# Patient Record
Sex: FEMALE | Race: AMERICAN INDIAN OR ALASKA NATIVE | NOT HISPANIC OR LATINO | Employment: OTHER | URBAN - METROPOLITAN AREA
[De-identification: names, ages, dates, MRNs, and addresses within clinical notes are randomized per-mention and may not be internally consistent; named-entity substitution may affect disease eponyms.]

---

## 2017-03-10 ENCOUNTER — ALLSCRIPTS OFFICE VISIT (OUTPATIENT)
Dept: OTHER | Facility: OTHER | Age: 57
End: 2017-03-10

## 2017-03-10 DIAGNOSIS — E03.9 HYPOTHYROIDISM: ICD-10-CM

## 2017-03-28 ENCOUNTER — HOSPITAL ENCOUNTER (OUTPATIENT)
Dept: RADIOLOGY | Facility: HOSPITAL | Age: 57
Discharge: HOME | End: 2017-03-28
Payer: COMMERCIAL

## 2017-03-28 ENCOUNTER — HOSPITAL ENCOUNTER (OUTPATIENT)
Dept: NON INVASIVE DIAGNOSTICS | Facility: HOSPITAL | Age: 57
Discharge: HOME | End: 2017-03-28
Payer: COMMERCIAL

## 2017-03-28 DIAGNOSIS — E03.9 HYPOTHYROIDISM: ICD-10-CM

## 2017-03-28 PROCEDURE — 93923 UPR/LXTR ART STDY 3+ LVLS: CPT

## 2017-03-28 PROCEDURE — 93925 LOWER EXTREMITY STUDY: CPT

## 2017-03-28 PROCEDURE — 73502 X-RAY EXAM HIP UNI 2-3 VIEWS: CPT

## 2017-03-30 ENCOUNTER — APPOINTMENT (OUTPATIENT)
Dept: LAB | Facility: HOSPITAL | Age: 57
End: 2017-03-30
Payer: COMMERCIAL

## 2017-03-30 DIAGNOSIS — E03.9 HYPOTHYROIDISM: ICD-10-CM

## 2017-03-30 LAB
ALBUMIN SERPL BCP-MCNC: 3.3 G/DL (ref 3.5–5)
ALP SERPL-CCNC: 75 U/L (ref 46–116)
ALT SERPL W P-5'-P-CCNC: 30 U/L (ref 12–78)
ANION GAP SERPL CALCULATED.3IONS-SCNC: 4 MMOL/L (ref 4–13)
AST SERPL W P-5'-P-CCNC: 25 U/L (ref 5–45)
BILIRUB SERPL-MCNC: 0.4 MG/DL (ref 0.2–1)
BUN SERPL-MCNC: 15 MG/DL (ref 5–25)
CALCIUM SERPL-MCNC: 9.2 MG/DL (ref 8.3–10.1)
CHLORIDE SERPL-SCNC: 103 MMOL/L (ref 100–108)
CHOLEST SERPL-MCNC: 153 MG/DL (ref 50–200)
CO2 SERPL-SCNC: 32 MMOL/L (ref 21–32)
CREAT SERPL-MCNC: 0.81 MG/DL (ref 0.6–1.3)
GFR SERPL CREATININE-BSD FRML MDRD: >60 ML/MIN/1.73SQ M
GLUCOSE P FAST SERPL-MCNC: 95 MG/DL (ref 65–99)
HDLC SERPL-MCNC: 67 MG/DL (ref 40–60)
LDLC SERPL CALC-MCNC: 69 MG/DL (ref 0–100)
POTASSIUM SERPL-SCNC: 4.5 MMOL/L (ref 3.5–5.3)
PROT SERPL-MCNC: 7.4 G/DL (ref 6.4–8.2)
SODIUM SERPL-SCNC: 139 MMOL/L (ref 136–145)
TRIGL SERPL-MCNC: 85 MG/DL
TSH SERPL DL<=0.05 MIU/L-ACNC: 2.9 UIU/ML (ref 0.36–3.74)

## 2017-03-30 PROCEDURE — 86430 RHEUMATOID FACTOR TEST QUAL: CPT

## 2017-03-30 PROCEDURE — 36415 COLL VENOUS BLD VENIPUNCTURE: CPT

## 2017-03-30 PROCEDURE — 80053 COMPREHEN METABOLIC PANEL: CPT

## 2017-03-30 PROCEDURE — 80061 LIPID PANEL: CPT

## 2017-03-30 PROCEDURE — 84443 ASSAY THYROID STIM HORMONE: CPT

## 2017-03-31 LAB — RHEUMATOID FACT SER QL LA: NEGATIVE

## 2017-04-04 ENCOUNTER — GENERIC CONVERSION - ENCOUNTER (OUTPATIENT)
Dept: OTHER | Facility: OTHER | Age: 57
End: 2017-04-04

## 2017-06-14 ENCOUNTER — GENERIC CONVERSION - ENCOUNTER (OUTPATIENT)
Dept: OTHER | Facility: OTHER | Age: 57
End: 2017-06-14

## 2017-08-05 ENCOUNTER — GENERIC CONVERSION - ENCOUNTER (OUTPATIENT)
Dept: OTHER | Facility: OTHER | Age: 57
End: 2017-08-05

## 2017-11-06 ENCOUNTER — ALLSCRIPTS OFFICE VISIT (OUTPATIENT)
Dept: OTHER | Facility: OTHER | Age: 57
End: 2017-11-06

## 2017-11-06 DIAGNOSIS — M79.641 PAIN OF RIGHT HAND: ICD-10-CM

## 2017-11-07 ENCOUNTER — GENERIC CONVERSION - ENCOUNTER (OUTPATIENT)
Dept: OTHER | Facility: OTHER | Age: 57
End: 2017-11-07

## 2017-11-15 ENCOUNTER — TRANSCRIBE ORDERS (OUTPATIENT)
Dept: ADMINISTRATIVE | Facility: HOSPITAL | Age: 57
End: 2017-11-15

## 2017-11-15 ENCOUNTER — APPOINTMENT (OUTPATIENT)
Dept: RADIOLOGY | Facility: MEDICAL CENTER | Age: 57
End: 2017-11-15
Payer: COMMERCIAL

## 2017-11-15 DIAGNOSIS — M79.641 PAIN OF RIGHT HAND: ICD-10-CM

## 2017-11-15 PROCEDURE — 73130 X-RAY EXAM OF HAND: CPT

## 2017-11-17 ENCOUNTER — GENERIC CONVERSION - ENCOUNTER (OUTPATIENT)
Dept: OTHER | Facility: OTHER | Age: 57
End: 2017-11-17

## 2017-12-11 ENCOUNTER — GENERIC CONVERSION - ENCOUNTER (OUTPATIENT)
Dept: FAMILY MEDICINE CLINIC | Facility: CLINIC | Age: 57
End: 2017-12-11

## 2017-12-11 ENCOUNTER — APPOINTMENT (OUTPATIENT)
Dept: OCCUPATIONAL THERAPY | Facility: MEDICAL CENTER | Age: 57
End: 2017-12-11
Payer: COMMERCIAL

## 2017-12-11 DIAGNOSIS — M79.641 PAIN OF RIGHT HAND: ICD-10-CM

## 2017-12-11 PROCEDURE — 97166 OT EVAL MOD COMPLEX 45 MIN: CPT

## 2017-12-11 PROCEDURE — G8990 OTHER PT/OT CURRENT STATUS: HCPCS

## 2017-12-11 PROCEDURE — G8991 OTHER PT/OT GOAL STATUS: HCPCS

## 2017-12-13 ENCOUNTER — APPOINTMENT (OUTPATIENT)
Dept: OCCUPATIONAL THERAPY | Facility: MEDICAL CENTER | Age: 57
End: 2017-12-13
Payer: COMMERCIAL

## 2017-12-14 ENCOUNTER — APPOINTMENT (OUTPATIENT)
Dept: OCCUPATIONAL THERAPY | Facility: MEDICAL CENTER | Age: 57
End: 2017-12-14
Payer: COMMERCIAL

## 2017-12-14 PROCEDURE — 97010 HOT OR COLD PACKS THERAPY: CPT

## 2017-12-14 PROCEDURE — 97140 MANUAL THERAPY 1/> REGIONS: CPT

## 2017-12-14 PROCEDURE — 97110 THERAPEUTIC EXERCISES: CPT

## 2017-12-18 ENCOUNTER — APPOINTMENT (OUTPATIENT)
Dept: OCCUPATIONAL THERAPY | Facility: MEDICAL CENTER | Age: 57
End: 2017-12-18
Payer: COMMERCIAL

## 2017-12-18 PROCEDURE — 97010 HOT OR COLD PACKS THERAPY: CPT

## 2017-12-18 PROCEDURE — 97110 THERAPEUTIC EXERCISES: CPT

## 2017-12-18 PROCEDURE — 97140 MANUAL THERAPY 1/> REGIONS: CPT

## 2017-12-20 ENCOUNTER — APPOINTMENT (OUTPATIENT)
Dept: OCCUPATIONAL THERAPY | Facility: MEDICAL CENTER | Age: 57
End: 2017-12-20
Payer: COMMERCIAL

## 2017-12-20 PROCEDURE — 97140 MANUAL THERAPY 1/> REGIONS: CPT

## 2017-12-20 PROCEDURE — 97110 THERAPEUTIC EXERCISES: CPT

## 2017-12-27 ENCOUNTER — APPOINTMENT (OUTPATIENT)
Dept: OCCUPATIONAL THERAPY | Facility: MEDICAL CENTER | Age: 57
End: 2017-12-27
Payer: COMMERCIAL

## 2017-12-27 PROCEDURE — 97110 THERAPEUTIC EXERCISES: CPT

## 2017-12-27 PROCEDURE — 97140 MANUAL THERAPY 1/> REGIONS: CPT

## 2017-12-27 PROCEDURE — 97010 HOT OR COLD PACKS THERAPY: CPT

## 2017-12-29 ENCOUNTER — ALLSCRIPTS OFFICE VISIT (OUTPATIENT)
Dept: OTHER | Facility: OTHER | Age: 57
End: 2017-12-29

## 2017-12-30 NOTE — PROGRESS NOTES
Assessment   1  Ganglion cyst of tendon sheath of right hand (729 42) (M67 441)    Plan   Ganglion cyst of tendon sheath of right hand    · Joint Bursa Aspiration &/or Injection Small - POC; Status:Complete - Retrospective    Authorization;   Done: [id number]   · Continue with our present treatment plan ; Status:Complete - Retrospective    Authorization;   Done: 63WIL6776   · Follow-up PRN Evaluation and Treatment  Follow-up  Status: Complete - Retrospective    Authorization  Done: [id number]    Discussion/Summary      R dorsal hand cyst and R wrist tendonitis   was discussed with the patient today  it was discussed with the patient that is a 50/50 chance on the cyst reoccurring wishes to go through with aspiration,   5cc was drained today in office the patient, site, and procedure were identified, verbal consent was obtained  The R dorsal hand was sterilely prepped and injected with 1% lidocaine without epinephrine  After confirmation of adequate anesthesia, the area was prepped again and the ganglion was aspirated with an 18 gauge needle  Sterile dressings were applied  The patient tolerated the procedure well  The patient is aware of a 50% success rate with possible recurrence of the ganglion cyst  prn    attestation:   FABIENNE Alanis, ATC, am acting as a scribe while in the presence of the attending physician  History of Present Illness   HPI: Pt is a 61year old female who presents today with R wrist pain and lump on the dorsal aspect of her hand and on the volar radial side of R wrist  The lump appeared about 3 months ago  She notices that she is dropping items, notices weakness and some numbness and tingling in her R index, long, ring and small fingers  Pt has been going to physical therapy which she states is not improving her symptoms  past medical history, medications, allergies, and review of systems have been read and reviewed on the chart and have been updated      of Systems: Negative Negative except as above As above Negative except as above Negative              Active Problems   1  Encounter for monitoring SBE (subacute bacterial endocarditis) prophylaxis (V58 83)     (Z51 81)   2  Flu vaccine need (V04 81) (Z23)   3  Hip pain, right (719 45) (M25 551)   4  Hyperlipidemia (272 4) (E78 5)   5  Hypertension (401 9) (I10)   6  Hypothyroidism (244 9) (E03 9)   7  Pain of right hand (729 5) (M79 641)   8  Paresthesias (782 0) (R20 2)   9  Right foot pain (729 5) (M79 671)    Past Medical History    · History of Hearing Loss (389 9)   · Denied: History of alcohol abuse   · Denied: History of mental disorder   · Denied: History of substance abuse   · History of Viral syndrome (079 99) (B34 9)    Surgical History    · History of Gallbladder Surgery   · History of Hernia Repair    Family History   Mother    · Family history of Diabetes   · Family history of alcohol abuse (V61 41) (Z81 1)  Sister    · Family history of alcohol abuse (V61 41) (Z81 1)  Brother    · Family history of alcohol abuse (V61 41) (Z81 1)  Family History    · Denied: Family history of mental disorder   · Denied: Family history of substance abuse   · Family history of Hypertension   · Family history of No Significant Family History   · Family history of Stroke    Social History    · Always uses seat belt   · Being A Social Drinker   · [employer]   · Lives with spouse   · daughter   ·    · Never A Smoker   · No advance directives (V49 89) (Z78 9)   · Retired   · [employer]   · Supportive and safe   · Two children   · Uses     Current Meds    1  CVS Aspirin Adult Low Dose 81 MG Oral Tablet Chewable; CHEW AND SWALLOW 1     TABLET DAILY; Therapy: 90RPP3629 to (Evaluate:11Oct2017)  Requested for: 87QUN6713; Last     Rx:13Jun2017 Ordered   2  Lisinopril 10 MG Oral Tablet; Take 1 tablet daily as directed;      Therapy: 55YIL5911 to (Evaluate:27Oct2018)  Requested for: 41YSQ2930; Last Rx:01Nov2017 Ordered   3  Pravastatin Sodium 40 MG Oral Tablet; TAKE 1 TABLET DAILY AS DIRECTED; Therapy: 30Kti2246 to (Evaluate:27Oct2018)  Requested for: 62SSP7635; Last     Rx:01Nov2017 Ordered    Allergies   1  Penicillins   2  Codeine Derivatives    Vitals   Signs   Heart Rate: 82  Systolic: 042, LUE, Sitting  Diastolic: 69, LUE, Sitting  Height: 5 ft   Weight: 184 lb   BMI Calculated: 35 94  BSA Calculated: 1 8    Physical Exam      Right Wrist: Appearance: Normal except  Tenderness: None except the  ROM: Full except as noted: Motor: Normal except as noted: Special Tests: Negative except  (dorsal cyst on R hand extensor tendon, R volar radial cyst  full wrist and elbow ROM  full fist formation   minimal swelling of R wrist  )           Procedure      Procedure: Aspiration of the ganglion cyst tendon sheath on the left    Procedure Note:    Post-Procedure:      Signatures    Electronically signed by : RACHELLE Martins ; Dec 29 2017  5:39PM EST                       (Author)

## 2018-01-10 NOTE — PROGRESS NOTES
Assessment    1  Encounter for preventive health examination (V70 0) (Z00 00)    Plan   Pain of right hand    · * XR HAND 3+ VIEW RIGHT; Status:Active; Requested CVA:29FKG3907;     2 - [facility]  (Podiatry) Co-Management  *  Status: Hold For - Scheduling  Requested for: 26HLB9384  Ordered; For: Right foot pain;  Ordered By: Marita Galeazzi  Performed:   Due: 11FGI4716  Fluzone Quadrivalent Intramuscular Suspension; INJECT 0 5  ML Intramuscular; Dose: 0 5; Route: Intramuscular; Site: Left Thigh; Done: 14KEW5089 12:01PM; Status: Complete - Retrospective By Protocol Authorization;  Ordered  For: Flu vaccine need; Ordered By:Richard Preston; Effective Date:06Nov2017; Administered by: Erica Frazier MA: 11/6/2017 12:01:00 PM; Last Updated By: Erica Frazier; 11/6/2017 12:42:36 PM     Discussion/Summary  health maintenance visit healthy adult female Currently, she eats an adequate diet and has an adequate exercise regimen  cervical cancer screening is current Advice and education were given regarding nutrition  Patient discussion: discussed with the patient  Tylenol three times a day, 1,000 mg  Motrin, Ibuprofen, advil; 600 mg three times a day  Or  Aleve 220 mg twice a day  The treatment plan was reviewed with the patient/guardian  The patient/guardian understands and agrees with the treatment plan      Chief Complaint  Pt here for physical      History of Present Illness  HM, Adult Female: The patient is being seen for a health maintenance evaluation  The last health maintenance visit was 1 year(s) ago  General Health: The patient's health since the last visit is described as good  She has regular dental visits  Lifestyle:  She consumes a diverse and healthy diet  She has weight concerns  Weight control issues: obesity  She exercises regularly  (walking)   She does not use tobacco  She denies alcohol use  She denies drug use     Reproductive health: the patient is postmenopausal   she reports normal menses  she uses no contraception  she is not sexually active  Screening: cancer screening reviewed and current  Cervical cancer screening includes a pap smear performed 2017  Colorectal cancer screening includes a colonoscopy performed 8/15  metabolic screening reviewed and current  Metabolic screening includes lipid profile performed within the past five years and glucose screening performed last year  Cardiovascular risk factors: sedentary lifestyle  Review of Systems    Constitutional: No fever, no chills, feels well, no tiredness, no recent weight gain or weight loss  Cardiovascular: No complaints of slow heart rate, no fast heart rate, no chest pain, no palpitations, no leg claudication, no lower extremity edema  Respiratory: No complaints of shortness of breath, no wheezing, no cough, no SOB on exertion, no orthopnea, no PND  The patient presents with complaints of arthralgias (right wrist and right outside of foot  )  ROS reviewed  Active Problems    1  Encounter for monitoring SBE (subacute bacterial endocarditis) prophylaxis (V58 83)   (Z51 81)   2  Flu vaccine need (V04 81) (Z23)   3  Hip pain, right (719 45) (M25 551)   4  Hyperlipidemia (272 4) (E78 5)   5  Hypertension (401 9) (I10)   6  Hypothyroidism (244 9) (E03 9)   7   Paresthesias (782 0) (R20 2)    Past Medical History    · History of Hearing Loss (389 9)   · Denied: History of alcohol abuse   · Denied: History of mental disorder   · Denied: History of substance abuse   · History of Viral syndrome (079 99) (B34 9)    Surgical History    · History of Gallbladder Surgery   · History of Hernia Repair    Family History  Mother    · Family history of Diabetes   · Family history of alcohol abuse (V61 41) (Z81 1)  Sister    · Family history of alcohol abuse (V61 41) (Z81 1)  Brother    · Family history of alcohol abuse (V61 41) (Z81 1)  Family History    · Denied: Family history of mental disorder   · Denied: Family history of substance abuse   · Family history of Hypertension   · Family history of No Significant Family History   · Family history of Stroke    Social History    · Always uses seat belt   · Being A Social Drinker   · [employer]   · Lives with spouse   · daughter   ·    · Never A Smoker   · No advance directives (V49 89) (Z78 9)   · Retired   · [employer]   · Supportive and safe   · Two children   · Uses     Current Meds   1  CVS Aspirin Adult Low Dose 81 MG Oral Tablet Chewable; CHEW AND SWALLOW 1   TABLET DAILY; Therapy: 23IEP5106 to (Evaluate:11Oct2017)  Requested for: 68SHV9590; Last   Rx:13Jun2017 Ordered   2  Lisinopril 10 MG Oral Tablet; Take 1 tablet daily as directed; Therapy: 83Kdy6703 to (Evaluate:27Oct2018)  Requested for: 42RMC7661; Last   Rx:01Nov2017 Ordered   3  Pravastatin Sodium 40 MG Oral Tablet; TAKE 1 TABLET DAILY AS DIRECTED; Therapy: 76Xad0882 to (Evaluate:27Oct2018)  Requested for: 43HHO5098; Last   Rx:01Nov2017 Ordered    Allergies    1  Penicillins   2  Codeine Derivatives    Vitals   Recorded: 55EDN9890 11:49AM   Temperature 97 6 F, Temporal   Heart Rate 92   Respiration 16   Systolic 074   Diastolic 86   Height 5 ft    Weight 183 lb 8 oz   BMI Calculated 35 84   BSA Calculated 1 8     Physical Exam    Constitutional   General appearance: No acute distress, well appearing and well nourished  Head and Face   Head and face: Normal     Ears, Nose, Mouth, and Throat   Otoscopic examination: Tympanic membranes translucent with normal light reflex  Canals patent without erythema  Nasal mucosa, septum, and turbinates: Normal without edema or erythema  Lips, teeth, and gums: Normal, good dentition  Oropharynx: Normal with no erythema, edema, exudate or lesions  Neck   Neck: Supple, symmetric, trachea midline, no masses  Pulmonary   Respiratory effort: No increased work of breathing or signs of respiratory distress      Auscultation of lungs: Clear to auscultation  Cardiovascular   Auscultation of heart: Normal rate and rhythm, normal S1 and S2, no murmurs  Abdomen   Abdomen: Non-tender, no masses  Musculoskeletal   Gait and station: Normal     Joints, bones, and muscles: Abnormal   Appearance - right wrist swelling and right wrist deformity  Palpation - right wrist tenderness  Range of motion: Normal     Muscle strength/tone: Normal     Skin   Palpation of skin and subcutaneous tissue: Normal turgor  Psychiatric   Orientation to person, place, and time: Normal     Mood and affect: Normal        Health Management  Hyperlipidemia   (1) T4, FREE; every 6 months; Next Due: 37AGN5700; Overdue    Signatures   Electronically signed by : Mai Saleem; Nov 6 2017 12:39PM EST                       (Author)    Electronically signed by :  Juan Pablo Pang MD; Nov 6 2017  2:06PM EST                       (Author)

## 2018-01-10 NOTE — RESULT NOTES
Verified Results  * XR HAND 3+ VIEW RIGHT 10FOW8546 09:13AM Lisandro Hammond Order Number: [id number]     Test Name Result Flag Reference   XR HAND 3+ VW RIGHT (Report)     RIGHT HAND     INDICATION: M79 641: Pain in right hand  History taken directly from the electronic ordering system  COMPARISON: None     VIEWS: 3     IMAGES: 3     For the purposes of institution wide universal language the following terms will apply: (thumb=1st digit/finger, index finger=2nd digit/finger, long finger=3rd digit/finger, ring=4th digit/finger and small finger=5th digit/finger)     FINDINGS:     There is no acute fracture or dislocation  No degenerative changes  No lytic or blastic lesions are seen  Soft tissues are unremarkable  IMPRESSION:     No acute osseous abnormality         Workstation performed: [id number]     Signed by:   Sandra Rivera MD   11/21/17

## 2018-01-13 VITALS
SYSTOLIC BLOOD PRESSURE: 130 MMHG | DIASTOLIC BLOOD PRESSURE: 84 MMHG | BODY MASS INDEX: 37.22 KG/M2 | WEIGHT: 189.6 LBS | HEART RATE: 72 BPM | HEIGHT: 60 IN

## 2018-01-14 VITALS
WEIGHT: 183.5 LBS | HEART RATE: 92 BPM | DIASTOLIC BLOOD PRESSURE: 86 MMHG | RESPIRATION RATE: 16 BRPM | SYSTOLIC BLOOD PRESSURE: 132 MMHG | BODY MASS INDEX: 36.02 KG/M2 | HEIGHT: 60 IN | TEMPERATURE: 97.6 F

## 2018-01-15 NOTE — RESULT NOTES
Verified Results  (1) COMPREHENSIVE METABOLIC PANEL 98ITM4423 43:23GV Dominga Lopez Order Number: [id number]     Test Name Result Flag Reference   SODIUM 139 mmol/L  136-145   POTASSIUM 4 5 mmol/L  3 5-5 3   CHLORIDE 103 mmol/L  100-108   CARBON DIOXIDE 32 mmol/L  21-32   ANION GAP (CALC) 4 mmol/L  4-13   BLOOD UREA NITROGEN 15 mg/dL  5-25   CREATININE 0 81 mg/dL  0 60-1 30   Standardized to IDMS reference method   CALCIUM 9 2 mg/dL  8 3-10 1   BILI, TOTAL 0 40 mg/dL  0 20-1 00   ALK PHOSPHATAS 75 U/L     ALT (SGPT) 30 U/L  12-78   AST(SGOT) 25 U/L  5-45   ALBUMIN 3 3 g/dL L 3 5-5 0   TOTAL PROTEIN 7 4 g/dL  6 4-8 2   eGFR Non-African American      >60 0 ml/min/1 73sq m   - Patient Instructions: This is a fasting blood test  Water,black tea or black  coffee only after 9:00pm the night before test Drink 2 glasses of water the morning of test - Patient Instructions: This bloodwork is non-fasting  Please drink two glasses of   water morning of bloodwork  National Kidney Disease Education Program recommendations are as follows:  GFR calculation is accurate only with a steady state creatinine  Chronic Kidney disease less than 60 ml/min/1 73 sq  meters  Kidney failure less than 15 ml/min/1 73 sq  meters  GLUCOSE FASTING 95 mg/dL  65-99     (1) TSH 42ZXQ5302 10:05AM Dominga Lopez Order Number: [id number]     Test Name Result Flag Reference   TSH 2 895 uIU/mL  0 358-3 740   - Patient Instructions: This bloodwork is non-fasting  Please drink two glasses of water morning of bloodwork  - Patient Instructions: This is a fasting blood test  Water,black tea or black  coffee only after 9:00pm the night before test Drink 2 glasses of water the morning of test - Patient Instructions: This bloodwork is non-fasting  Please drink two glasses of   water morning of bloodwork    Patients undergoing fluorescein dye angiography may retain small amounts of fluorescein in the body for 48-72 hours post procedure  Samples containing fluorescein can produce falsely depressed TSH values  If the patient had this procedure,a specimen should be resubmitted post fluorescein clearance  The recommended reference ranges for TSH during pregnancy are as follows:  First trimester 0 1 to 2 5 uIU/mL  Second trimester  0 2 to 3 0 uIU/mL  Third trimester 0 3 to 3 0 uIU/m     (1) LIPID PANEL, FASTING 61JOL6295 10:05AM Kvng Mayer Order Number: [id number]     Test Name Result Flag Reference   CHOLESTEROL 153 mg/dL     HDL,DIRECT 67 mg/dL H 40-60   Specimen collection should occur prior to Metamizole administration due to the potential for falsely depressed results  LDL CHOLESTEROL CALCULATED 69 mg/dL  0-100   - Patient Instructions: This is a fasting blood test  Water,black tea or black  coffee only after 9:00pm the night before test   Drink 2 glasses of water the morning of test     - Patient Instructions: This is a fasting blood test  Water,black tea or black  coffee only after 9:00pm the night before test Drink 2 glasses of water the morning of test - Patient Instructions: This bloodwork is non-fasting  Please drink two glasses of   water morning of bloodwork  Triglyceride:         Normal              <150 mg/dl       Borderline High    150-199 mg/dl       High               200-499 mg/dl       Very High          >499 mg/dl  Cholesterol:         Desirable        <200 mg/dl      Borderline High  200-239 mg/dl      High             >239 mg/dl  HDL Cholesterol:        High    >59 mg/dL      Low     <41 mg/dL  LDL CALCULATED:    This screening LDL is a calculated result  It does not have the accuracy of the Direct Measured LDL in the monitoring of patients with hyperlipidemia and/or statin therapy  Direct Measure LDL (EJJ212) must be ordered separately in these patients     TRIGLYCERIDES 85 mg/dL  <=150   Specimen collection should occur prior to N-Acetylcysteine or Metamizole administration due to the potential for falsely depressed results  (1) RHEUMATOID FACTOR SCREEN 02RYC6894 10:05AM Anthill Order Number: [id number]     Test Name Result Flag Reference   RHEUMATOID FACTOR Negative  Negative     * XR HIP/PELV 2-3 VWS RIGHT W PELVIS IF PERFORMED 21NOI6519 09:41AM Anthill Order Number: [id number]     Test Name Result Flag Reference   * XR HIP/PELV 2-3 VWS RIGHT (Report)     RIGHT HIP     INDICATION: 2 months of pain  COMPARISON: 4/6/2005     VIEWS: AP pelvis and 2 coned down views of the hip     IMAGES: 3     FINDINGS:     There is no acute fracture or dislocation  No degenerative changes  No lytic or blastic lesions are seen  Soft tissues are unremarkable  IMPRESSION:     No acute osseous abnormality  Workstation performed: [id number]     Signed by:   Merlin Cornwall, DO   3/30/17     VAS LOWER LIMB ARTERIAL DUPLEX, COMPLETE BILATERAL/GRAFTS 14BNN0556 09:37AM Anthill Order Number: [id number]    - Patient Instructions: To schedule this appointment, please contact Central Scheduling at [phone]  Test Name Result Flag Reference   VAS LOWER LIMB ARTERIAL DUPLEX, COMPLETE BILATERAL/GRAFTS (Report)     [facility] REPORT   CLINICAL:   Indications: Bilateral calf cramping/ pain mostly at night  Risk Factors: The patient has history of Hypertension, Hyperlipidemia and   non-smoker  Right Brachial Pressure: 111/ mm Hg, Left Brachial Pressure: 103/ mm Hg  FINDINGS:      Segment        Rig    Left                        PSV EDV PSV EDV    Common Femoral Artery 119  24 127  4    Prox Profunda      91  8  59  8    Prox SFA        109  0 125  2    Mid SFA        104  0  92  4    Dist SFA        74  2  69  0    Proximal Pop      81  2  66  6    Distal Pop       82  2  78  4    Dist Post Tibial    89  0  83  5    Dist  Ant   Tibial    62  5  79  11             CONCLUSION:   Impression:   Right Lower Limb   Ankle Pressure: 140 mm Hg , KEELY: 1 26, normal range  Right Metatarsal Pressure: 112 mm Hg  Right Great Toe Pressure 91 mm Hg , above healing threshold  Conclusion: There is no evidence of a hemodynamically significant stenosis   noted in the femoropopliteal arteries  Left Lower Limb   Ankle Pressure 130 mm Hg , KEELY: 1 17, normal range  Left Metatarsal Pressure: 144 mm Hg  Left Great Toe Pressure 90 mm Hg , above healing threshold  Conclusion: There is no evidence of a hemodynamically significant stenosis   noted in the femoropopliteal arteries        SIGNATURE:   Electronically Signed by: Hiwot Lara MD on 2017-03-28 03:49:05 PM

## 2018-01-18 ENCOUNTER — ALLSCRIPTS OFFICE VISIT (OUTPATIENT)
Dept: OTHER | Facility: OTHER | Age: 58
End: 2018-01-18

## 2018-01-18 ENCOUNTER — GENERIC CONVERSION - ENCOUNTER (OUTPATIENT)
Dept: OTHER | Facility: OTHER | Age: 58
End: 2018-01-18

## 2018-01-18 ENCOUNTER — APPOINTMENT (OUTPATIENT)
Dept: RADIOLOGY | Facility: MEDICAL CENTER | Age: 58
End: 2018-01-18
Payer: COMMERCIAL

## 2018-01-18 ENCOUNTER — TRANSCRIBE ORDERS (OUTPATIENT)
Dept: ADMINISTRATIVE | Facility: HOSPITAL | Age: 58
End: 2018-01-18

## 2018-01-18 DIAGNOSIS — S89.92XA INJURY OF LEFT LOWER LEG: ICD-10-CM
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
## 2018-01-18 PROCEDURE — 73564 X-RAY EXAM KNEE 4 OR MORE: CPT

## 2018-01-19 NOTE — PROGRESS NOTES
Assessment   1  Injury of left knee, initial encounter (959 7) (H98 28CD)   2  Fall, initial encounter (E888 9) (P24 QKOY)    Plan   Fall, initial encounter, Injury of left knee, initial encounter    · * XR KNEE 4+ VW LEFT INJURY; Status:Active; Requested EKL:[id number];    · Canes Single Prong; Status:Complete;   Done: 20JSX0689  Injury of left knee, initial encounter    · Acetaminophen Extra Strength 500 MG Oral Tablet; TAKE 2 TABLET 4 times daily    PRN   · Treat the injury with rest, ice, compression, and elevation ; Status:Complete;   Done:    97TYJ0017   · We want you to do gentle knee range of motion exercises ; Status:Complete;   Done:    87KTU9824   · Call [phone] if: The symptoms are not better in 7 days ; Status:Complete;   Done:    87TTS2301    Discussion/Summary      Get Xray done  Use cane to help with walking  Recommend knee brace  If not improved in 1-2 weeks recommend MRI  20 minutes 4 times a day  Possible side effects of new medications were reviewed with the patient/guardian today  The treatment plan was reviewed with the patient/guardian  The patient/guardian understands and agrees with the treatment plan      Chief Complaint   Pt here for left leg knee pain  Pt states falling and twisting knee  Pt states knee is swollen below patella and painful  History of Present Illness   Knee Sprain/Strain (Brief): The patient is being seen for an initial evaluation of knee sprain/strain  She sustained an injury to the left knee  This occurred at home  The injury resulted from a fall and twisting the knee  (patient fell off the bed initially and iced her left knee it did feel better but a few days later she fell again slipping on ice on her back steps and again landed on the same left knee she iced it and it felt ok  Then she was walking the dog and the dog pulled and twisted her left knee  This time the pain is more severe and she having difficulty walking  ) Symptoms:  knee pain   The patient is currently experiencing symptoms  Review of Systems        Constitutional: not feeling poorly-- and-- not feeling tired  Musculoskeletal: as noted in HPI  Neurological: no numbness-- and-- no tingling  ROS reviewed  Active Problems   1  Encounter for monitoring SBE (subacute bacterial endocarditis) prophylaxis (V58 83)     (Z51 81)   2  Flu vaccine need (V04 81) (Z23)   3  Ganglion cyst of tendon sheath of right hand (727 42) (M67 441)   4  Hip pain, right (719 45) (M25 551)   5  Hyperlipidemia (272 4) (E78 5)   6  Hypertension (401 9) (I10)   7  Hypothyroidism (244 9) (E03 9)   8  Pain of right hand (729 5) (M79 641)   9  Paresthesias (782 0) (R20 2)   10  Right foot pain (729 5) (M79 671)    Past Medical History   1  History of Hearing Loss (389 9)   2  Denied: History of alcohol abuse   3  Denied: History of mental disorder   4  Denied: History of substance abuse   5  History of Viral syndrome (079 99) (B34 9)  Active Problems And Past Medical History Reviewed: The active problems and past medical history were reviewed and updated today  Family History   Mother    1  Family history of Diabetes   2  Family history of alcohol abuse (V61 41) (Z81 1)  Sister    3  Family history of alcohol abuse (V61 41) (Z81 1)  Brother    4  Family history of alcohol abuse (V61 41) (Z81 1)  Family History    5  Denied: Family history of mental disorder   6  Denied: Family history of substance abuse   7  Family history of Hypertension   8  Family history of No Significant Family History   9  Family history of Stroke  Family History Reviewed: The family history was reviewed and updated today         Social History    · Always uses seat belt   · Being A Social Drinker   · [employer]   · Lives with spouse   · daughter   ·    · Never A Smoker   · No advance directives (V49 89) (Z78 9)   · Retired   · [employer]   · Supportive and safe   · Two children   · Uses American Sign Language   The social history was reviewed and is unchanged  Surgical History   1  History of Gallbladder Surgery   2  History of Hernia Repair  Surgical History Reviewed: The surgical history was reviewed and updated today  Current Meds    1  [facility] Aspirin Adult Low Dose 81 MG Oral Tablet Chewable; CHEW AND SWALLOW 1     TABLET DAILY; Therapy: 66MBO1752 to (Evaluate:11Oct2017)  Requested for: 58DLV0175; Last     Rx:13Jun2017 Ordered   2  Lisinopril 10 MG Oral Tablet; Take 1 tablet daily as directed; Therapy: 79Edq2092 to (Evaluate:27Oct2018)  Requested for: 51UOH3077; Last     Rx:01Nov2017 Ordered   3  Pravastatin Sodium 40 MG Oral Tablet; TAKE 1 TABLET DAILY AS DIRECTED; Therapy: 88Qeq5097 to (Evaluate:27Oct2018)  Requested for: 90AQX8342; Last     Rx:01Nov2017 Ordered     The medication list was reviewed and updated today  Allergies   1  Penicillins   2  Codeine Derivatives    Vitals    Recorded: 98LUW3640 11:33AM   Temperature 97 8 F, Temporal   Heart Rate 78   Respiration 14   Systolic 898, Sitting   Diastolic 64, Sitting   Height 5 ft    Weight 188 lb    BMI Calculated 36 72   BSA Calculated 1 82   Pain Scale 8     Physical Exam        Constitutional      General appearance: No acute distress, well appearing and well nourished  Pulmonary      Respiratory effort: No increased work of breathing or signs of respiratory distress  Auscultation of lungs: Clear to auscultation  Musculoskeletal      Inspection/palpation of joints, bones, and muscles: Abnormal           Signatures    Electronically signed by :  Marina Charles MD; Jan 18 2018  1:25PM EST                       (Author)

## 2018-01-22 VITALS
WEIGHT: 188 LBS | HEART RATE: 78 BPM | HEIGHT: 60 IN | BODY MASS INDEX: 36.91 KG/M2 | RESPIRATION RATE: 14 BRPM | TEMPERATURE: 97.8 F | SYSTOLIC BLOOD PRESSURE: 116 MMHG | DIASTOLIC BLOOD PRESSURE: 64 MMHG

## 2018-01-23 VITALS
BODY MASS INDEX: 36.12 KG/M2 | HEART RATE: 82 BPM | SYSTOLIC BLOOD PRESSURE: 112 MMHG | HEIGHT: 60 IN | DIASTOLIC BLOOD PRESSURE: 69 MMHG | WEIGHT: 184 LBS

## 2018-01-23 NOTE — MISCELLANEOUS
To whom it may concernL    please excuse Gail Siddiqui from work to care for his spouse From January 17-Januray 19, 2018           Electronically signed by:Melly Umanzor  Jan 18 2018 12:08PM EST Author

## 2018-01-23 NOTE — RESULT NOTES
Verified Results  * XR KNEE 4+ VW LEFT INJURY [id number] 12:57PM Jeannie Mccaeb Order Number: [id number]     Test Name Result Flag Reference   XR KNEE 4+ VW LEFT (Report)     LEFT KNEE     INDICATION: Left knee pain  COMPARISON: Contralateral knee radiographs dated 4/6/2005  VIEWS: 4     IMAGES: 5     FINDINGS:     There is no acute fracture or dislocation  There is no joint effusion  Trace degenerative changes in the medial and patellofemoral compartments  No lytic or blastic lesions are seen  Soft tissues are unremarkable  IMPRESSION:     1  No acute osseous findings  2  Minimal conventional osteoarthritis         Workstation performed: [id number]     Signed by:   Zack Zhu MD   1/18/18       Signatures   Electronically signed by : Da Gonzalez; Jan 18 2018  4:48PM EST                       (Author)

## 2018-01-23 NOTE — MISCELLANEOUS
To whom it may concernL    please excuse Lien Molina from work to care for his spouse From January 17-Januray 20, 2018         Electronically signed by:eMlly Rojas  Jan 18 2018 12:08PM EST Author

## 2018-02-12 ENCOUNTER — TELEPHONE (OUTPATIENT)
Dept: FAMILY MEDICINE CLINIC | Facility: CLINIC | Age: 58
End: 2018-02-12

## 2018-02-14 ENCOUNTER — TELEPHONE (OUTPATIENT)
Dept: FAMILY MEDICINE CLINIC | Facility: CLINIC | Age: 58
End: 2018-02-14

## 2018-02-14 DIAGNOSIS — I10 ESSENTIAL HYPERTENSION: Primary | ICD-10-CM

## 2018-02-14 RX ORDER — ASPIRIN 81 MG/1
1 TABLET, CHEWABLE ORAL DAILY
COMMUNITY
Start: 2017-06-13 | End: 2018-02-14 | Stop reason: SDUPTHER

## 2018-02-14 RX ORDER — ASPIRIN 81 MG/1
81 TABLET, CHEWABLE ORAL DAILY
Qty: 30 TABLET | Refills: 3 | Status: SHIPPED | OUTPATIENT
Start: 2018-02-14 | End: 2018-10-19 | Stop reason: SDUPTHER

## 2018-02-15 DIAGNOSIS — R52 PAIN: Primary | ICD-10-CM

## 2018-02-16 ENCOUNTER — TELEPHONE (OUTPATIENT)
Dept: FAMILY MEDICINE CLINIC | Facility: CLINIC | Age: 58
End: 2018-02-16

## 2018-02-16 RX ORDER — PYRITHIONE ZINC 1 %
SHAMPOO TOPICAL
Qty: 14 TABLET | Refills: 0 | Status: SHIPPED | OUTPATIENT
Start: 2018-02-16 | End: 2018-11-19 | Stop reason: CLARIF

## 2018-02-16 NOTE — TELEPHONE ENCOUNTER
Not likely lisinopril  Common side effects of pravastatin  I would like her to stop taking the pravastatin for about two weeks and see if it changes  Can at times take 3-4 weeks to resolve if related to pravastatin

## 2018-02-20 ENCOUNTER — APPOINTMENT (OUTPATIENT)
Dept: RADIOLOGY | Facility: CLINIC | Age: 58
End: 2018-02-20
Payer: COMMERCIAL

## 2018-02-20 ENCOUNTER — OFFICE VISIT (OUTPATIENT)
Dept: OBGYN CLINIC | Facility: MEDICAL CENTER | Age: 58
End: 2018-02-20
Payer: COMMERCIAL

## 2018-02-20 VITALS
DIASTOLIC BLOOD PRESSURE: 68 MMHG | HEART RATE: 86 BPM | WEIGHT: 185.8 LBS | BODY MASS INDEX: 36.48 KG/M2 | SYSTOLIC BLOOD PRESSURE: 120 MMHG | HEIGHT: 60 IN

## 2018-02-20 DIAGNOSIS — Z01.89 ENCOUNTER FOR LOWER EXTREMITY COMPARISON IMAGING STUDY: ICD-10-CM

## 2018-02-20 DIAGNOSIS — M25.562 LEFT KNEE PAIN, UNSPECIFIED CHRONICITY: Primary | ICD-10-CM

## 2018-02-20 PROCEDURE — 73562 X-RAY EXAM OF KNEE 3: CPT

## 2018-02-20 PROCEDURE — 99214 OFFICE O/P EST MOD 30 MIN: CPT | Performed by: ORTHOPAEDIC SURGERY

## 2018-02-20 PROCEDURE — 73564 X-RAY EXAM KNEE 4 OR MORE: CPT

## 2018-02-20 NOTE — PROGRESS NOTES
Orthopaedic Surgery - Office Note  Sugey Loja (67 y o  female)   : 1960   MRN: [id number]  Encounter Date: 2018    Chief Complaint   Patient presents with    Left Knee - Pain       Assessment / Plan  L knee pain     · MRI of left knee  Return for Recheck after MRI  Patient was instructed that it is safe to leave her cyst alone at this point and that if she ever wants to surgically remove it that this is an option  History of Present Illness  Sugey Loja is a 60 y o  female who presents today with L knee pain that started 6 weeks ago as a results of falling when walking the dogs  Pt describes a twisting mechanism when she fell  Pt states that the pain wakes her up at night and c/o generalized stiffness  Pt takes NSAIDs for pain which seem to help however she does not want to be on this long term  Pt c/o pain when kneeling and walking up and down steps  Pt denies numbness and tingling  Pt is also here to talk about her ganglion cyst of tendon sheath of R hand  Pt states that after her last visit when it was aspirated it reoccurred and she would like to know if it is safe to leave it alone  Review of Systems  A comprehensive review of systems was negative except for:  HEENT: positive for hearing loss    Physical Exam  /68   Pulse 86   Ht 5' (1 524 m)   Wt 84 3 kg (185 lb 12 8 oz)   BMI 36 29 kg/m²   Cons: Appears well  No apparent distress  Psych: Alert  Oriented x3  Mood and affect normal   Eyes: PERRLA, EOMI  Resp: Normal effort  No audible wheezing or stridor  CV: Palpable pulse  No discernable arrhythmia  No LE edema  Lymph:  No palpable cervical, axillary, or inguinal lymphadenopathy  Skin: Warm  No palpable masses  No visible lesions  Neuro: Normal muscle tone  Normal and symmetric DTR's  Left Knee Exam  Alignment:  Normal knee alignment  Inspection:  mild swelling  No edema  No erythema  No ecchymosis  No muscle atrophy  No deformity    Palpation: moderate tenderness at medial and lateral joint line  ROM:  Normal knee ROM  Strength:  5/5 quadriceps and hamstrings  Stability:  No objective knee instability  Stable Varus / Valgus stress, Lachman, and Posterior drawer  Tests:  (+) Lurdes  Patella:  Patella tracks centrally without crepitus  Neurovascular:  Sensation intact in DP/SP/Harper/Sa/T nerve distributions  2+ DP & PT pulses  Gait:  Antalgic  Left Hand & Wrist Exam  Alignment:  Normal resting hand posture  Inspection:  No swelling  No edema  No erythema  No ecchymosis  No muscle atrophy  ganglion cyst of dorsal aspect of hand  Palpation:  No tenderness  ROM:  Normal wrist ROM  Normal finger ROM  Strength:  5/5  and pinch  Stability:  No objective hand or wrist instability  Tests:  No pertinent positive or negative tests  Neurovascular:  Sensation intact in Ax/R/M/U nerve distributions  2+ radial pulse  Brisk capillary refill in all fingertips  Studies Reviewed  XR of bilateral knee - shows minimal joint space narrowing    Procedures  No procedures today  Medical, Surgical, Family, and Social History  The patient's medical history, family history, and social history, were reviewed and updated as appropriate  Past Medical History:   Diagnosis Date    Stroke [facility])        Past Surgical History:   Procedure Laterality Date    GALLBLADDER SURGERY         History reviewed  No pertinent family history  Social History     Occupational History    Not on file       Social History Main Topics    Smoking status: Never Smoker    Smokeless tobacco: Never Used    Alcohol use Not on file    Drug use: Unknown    Sexual activity: Not on file       Allergies   Allergen Reactions    Codeine Polt-Chlorphen Polt Er     Penicillins Nausea Only         Current Outpatient Prescriptions:     aspirin 81 mg chewable tablet, Chew 1 tablet (81 mg total) daily, Disp: 30 tablet, Rfl: 3    [facility]S NON-ASPIRIN EXTRA STRENGTH 500 MG tablet, TAKE 2 TABLET 4 TIMES DAILY AS NEEDED, Disp: 14 tablet, Rfl: 0      Jackie Jones    Scriblos Attestation    I,:   Jackie Jones am acting as a scribe while in the presence of the attending physician :        I,:   Juli Hernandez MD personally performed the services described in this documentation    as scribed in my presence :

## 2018-02-26 ENCOUNTER — TELEPHONE (OUTPATIENT)
Dept: FAMILY MEDICINE CLINIC | Facility: CLINIC | Age: 58
End: 2018-02-26

## 2018-02-26 DIAGNOSIS — T78.40XD ALLERGIC DISORDER, SUBSEQUENT ENCOUNTER: Primary | ICD-10-CM

## 2018-02-26 RX ORDER — MONTELUKAST SODIUM 10 MG/1
10 TABLET ORAL
Qty: 90 TABLET | Refills: 3 | Status: SHIPPED | OUTPATIENT
Start: 2018-02-26 | End: 2020-10-19 | Stop reason: CLARIF

## 2018-03-03 ENCOUNTER — HOSPITAL ENCOUNTER (OUTPATIENT)
Dept: MRI IMAGING | Facility: HOSPITAL | Age: 58
Discharge: HOME | End: 2018-03-03
Attending: ORTHOPAEDIC SURGERY
Payer: COMMERCIAL

## 2018-03-03 DIAGNOSIS — M25.562 LEFT KNEE PAIN, UNSPECIFIED CHRONICITY: ICD-10-CM

## 2018-03-03 PROCEDURE — 73721 MRI JNT OF LWR EXTRE W/O DYE: CPT

## 2018-03-09 ENCOUNTER — OFFICE VISIT (OUTPATIENT)
Dept: OBGYN CLINIC | Facility: MEDICAL CENTER | Age: 58
End: 2018-03-09
Payer: COMMERCIAL

## 2018-03-09 VITALS — HEART RATE: 78 BPM | DIASTOLIC BLOOD PRESSURE: 58 MMHG | SYSTOLIC BLOOD PRESSURE: 92 MMHG

## 2018-03-09 DIAGNOSIS — S89.92XA INJURY OF LEFT KNEE, INITIAL ENCOUNTER: Primary | ICD-10-CM

## 2018-03-09 PROCEDURE — 99214 OFFICE O/P EST MOD 30 MIN: CPT | Performed by: ORTHOPAEDIC SURGERY

## 2018-03-09 RX ORDER — LISINOPRIL 10 MG/1
1 TABLET ORAL DAILY
COMMUNITY
Start: 2014-08-25 | End: 2018-11-09 | Stop reason: SDUPTHER

## 2018-03-09 RX ORDER — CLINDAMYCIN HYDROCHLORIDE 300 MG/1
CAPSULE ORAL AS NEEDED
COMMUNITY
Start: 2018-02-26 | End: 2018-06-07 | Stop reason: SDUPTHER

## 2018-03-09 NOTE — PROGRESS NOTES
Orthopaedic Surgery - Office Note  Sugey Loja (8 y o  female)   : 1960   MRN: [id number]  Encounter Date: 3/9/2018    Chief Complaint   Patient presents with    Left Knee - Follow-up       Assessment / Plan  L knee pain     · The diagnosis and treatment options were reviewed  · Recommend protected weightbearing with cane or crutches to allow insufficiency fracture to heal   · Nonsurgical treatment for medial meniscus root tear   · NSAIDS or tylenol prn pain  Return in about 2 months (around 2018)  Patient was instructed that it is safe to leave her cyst alone at this point and that if she ever wants to surgically remove it that this is an option  History of Present Illness  Sugey Loja is a 60 y o  female who presents today to follow up L knee pain that started the last week of 2018 as a results of falling when walking the dogs  Pt describes a twisting mechanism when she fell  Pt states that the pain wakes her up at night and c/o generalized stiffness  Pt takes NSAIDs for pain which seem to help however she does not want to be on this long term  Pt c/o pain when kneeling and walking up and down steps  Pt denies numbness and tingling  She is here to review her knee MRI  Review of Systems  A comprehensive review of systems was negative except for:  HEENT: positive for hearing loss    Physical Exam  BP 92/58   Pulse 78   Cons: Appears well  No apparent distress  Psych: Alert  Oriented x3  Mood and affect normal   Eyes: PERRLA, EOMI  Resp: Normal effort  No audible wheezing or stridor  CV: Palpable pulse  No discernable arrhythmia  No LE edema  Lymph:  No palpable cervical, axillary, or inguinal lymphadenopathy  Skin: Warm  No palpable masses  No visible lesions  Neuro: Normal muscle tone  Normal and symmetric DTR's  Left Knee Exam  Alignment:  Normal knee alignment  Inspection:  mild swelling  No edema  No erythema  No ecchymosis  No muscle atrophy   No deformity  Palpation:  moderate tenderness at medial and lateral joint line  ROM:  Normal knee ROM  Strength:  5/5 quadriceps and hamstrings  Stability:  No objective knee instability  Stable Varus / Valgus stress, Lachman, and Posterior drawer  Tests:  (+) Lurdes  Patella:  Patella tracks centrally without crepitus  Neurovascular:  Sensation intact in DP/SP/Harper/Sa/T nerve distributions  2+ DP & PT pulses  Gait:  Antalgic  Left Hand & Wrist Exam  Alignment:  Normal resting hand posture  Inspection:  No swelling  No edema  No erythema  No ecchymosis  No muscle atrophy  ganglion cyst of dorsal aspect of hand  Palpation:  No tenderness  ROM:  Normal wrist ROM  Normal finger ROM  Strength:  5/5  and pinch  Stability:  No objective hand or wrist instability  Tests:  No pertinent positive or negative tests  Neurovascular:  Sensation intact in Ax/R/M/U nerve distributions  2+ radial pulse  Brisk capillary refill in all fingertips  Studies Reviewed  I have personally reviewed pertinent films in PACS  MRI of left knee - medial femoral condyle insufficiency fracture and medial meniscus posterior root tear    Procedures  No procedures today  Medical, Surgical, Family, and Social History  The patient's medical history, family history, and social history, were reviewed and updated as appropriate  Past Medical History:   Diagnosis Date    Stroke [facility])        Past Surgical History:   Procedure Laterality Date    GALLBLADDER SURGERY         History reviewed  No pertinent family history  Social History     Occupational History    Not on file       Social History Main Topics    Smoking status: Never Smoker    Smokeless tobacco: Never Used    Alcohol use Not on file    Drug use: Unknown    Sexual activity: Not on file       Allergies   Allergen Reactions    Codeine Polt-Chlorphen Polt Er     Penicillins Nausea Only         Current Outpatient Prescriptions:     aspirin 81 mg chewable tablet, Chew 1 tablet (81 mg total) daily, Disp: 30 tablet, Rfl: 3    clindamycin (CLEOCIN) 300 MG capsule, as needed, Disp: , Rfl:     [facility]S NON-ASPIRIN EXTRA STRENGTH 500 MG tablet, TAKE 2 TABLET 4 TIMES DAILY AS NEEDED, Disp: 14 tablet, Rfl: 0    lisinopril (ZESTRIL) 10 mg tablet, Take 1 tablet by mouth daily, Disp: , Rfl:     montelukast (SINGULAIR) 10 mg tablet, Take 1 tablet (10 mg total) by mouth daily at bedtime for 90 days, Disp: 90 tablet, Rfl: 3      Brandie Pierce MD    Scribe Attestation    I,:    am acting as a scribe while in the presence of the attending physician :        I,:    personally performed the services described in this documentation    as scribed in my presence :

## 2018-03-12 ENCOUNTER — TELEPHONE (OUTPATIENT)
Dept: FAMILY MEDICINE CLINIC | Facility: CLINIC | Age: 58
End: 2018-03-12

## 2018-03-12 NOTE — TELEPHONE ENCOUNTER
I see nothing about her cholesterol or medication   Would recommedn she gat appt and we go over everything and make decision about med

## 2018-03-12 NOTE — TELEPHONE ENCOUNTER
Patient called stating, Pravastin 40 mg was prescribed to her a few weeks ago  She stated this medication was causing knee pain and that the dosage was too high  It was mentioned another medication was going to be prescribed? Patient would like a new med and a call back
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
## 2018-03-18 PROBLEM — E03.9 HYPOTHYROIDISM: Status: ACTIVE | Noted: 2017-03-10

## 2018-03-18 PROBLEM — M79.641 PAIN OF RIGHT HAND: Status: ACTIVE | Noted: 2017-11-06

## 2018-03-18 PROBLEM — M67.441 GANGLION OF RIGHT HAND: Status: ACTIVE | Noted: 2017-12-29

## 2018-03-18 PROBLEM — M25.551 HIP PAIN, RIGHT: Status: ACTIVE | Noted: 2017-03-10

## 2018-03-18 PROBLEM — M79.671 RIGHT FOOT PAIN: Status: ACTIVE | Noted: 2017-11-06

## 2018-03-18 NOTE — ASSESSMENT & PLAN NOTE
Pravastatin 40 mg discontinued to due to intolerance of myalgias  Recent lipid profile included HDL 67, TRIG 85, and LDL 69  March 2017  Patient wishes to continue pravastatin at lower dose of 20mg

## 2018-03-19 ENCOUNTER — OFFICE VISIT (OUTPATIENT)
Dept: FAMILY MEDICINE CLINIC | Facility: CLINIC | Age: 58
End: 2018-03-19
Payer: COMMERCIAL

## 2018-03-19 VITALS
BODY MASS INDEX: 37.11 KG/M2 | DIASTOLIC BLOOD PRESSURE: 76 MMHG | WEIGHT: 189 LBS | RESPIRATION RATE: 16 BRPM | SYSTOLIC BLOOD PRESSURE: 122 MMHG | HEART RATE: 84 BPM | HEIGHT: 60 IN | TEMPERATURE: 98.9 F

## 2018-03-19 DIAGNOSIS — E78.2 MIXED HYPERLIPIDEMIA: Primary | ICD-10-CM

## 2018-03-19 PROCEDURE — 3008F BODY MASS INDEX DOCD: CPT | Performed by: NURSE PRACTITIONER

## 2018-03-19 PROCEDURE — 99214 OFFICE O/P EST MOD 30 MIN: CPT | Performed by: NURSE PRACTITIONER

## 2018-03-19 RX ORDER — PRAVASTATIN SODIUM 20 MG
20 TABLET ORAL DAILY
Qty: 30 TABLET | Refills: 0 | Status: SHIPPED | OUTPATIENT
Start: 2018-03-19 | End: 2018-04-18 | Stop reason: SDUPTHER

## 2018-03-19 NOTE — PROGRESS NOTES
Assessment/Plan:    Mixed hyperlipidemia  Pravastatin 40 mg discontinued to due to intolerance of myalgias  Recent lipid profile included HDL 67, TRIG 85, and LDL 69  March 2017  Patient wishes to continue pravastatin at lower dose of 20mg  Diagnoses and all orders for this visit:    Mixed hyperlipidemia  -     pravastatin (PRAVACHOL) 20 mg tablet; Take 1 tablet (20 mg total) by mouth daily  -     Lipid panel; Future        Risk and benefits of treatment with pravastatin vs alternative vs no treatment explained to the patient  At this time she chooses to try pravastatin at a lower dose for fear of being off statins in consideration of her history of TIA  Times spent counseling 10 minutes in a 15 minute visit  Subjective:      Patient ID: Dana Gutierrez is a 60 y o  female  Patient  is translating for the patient  She has a history of TIA and was told she needs to be on statin for lifelong  She states though after she stopped taking pravastatin 40mg she feels so much better  Recent lipid profile included HDL 67, TRIG 85, and LDL 69  March 2017  She states she has been on pravastatin since 2013  The following portions of the patient's history were reviewed and updated as appropriate: allergies, current medications, past family history, past medical history, past social history, past surgical history and problem list     Review of Systems   Constitutional: Positive for diaphoresis  HENT: Positive for trouble swallowing  Eyes: Positive for visual disturbance  Respiratory: Positive for chest tightness and shortness of breath  Cardiovascular: Positive for chest pain and palpitations  Musculoskeletal: Positive for arthralgias (left knee) and myalgias  Neurological: Positive for dizziness and light-headedness  Psychiatric/Behavioral: Positive for dysphoric mood (better since being off pravastatin)  Negative for behavioral problems           Objective:      /76 (BP Location: Left arm, Patient Position: Sitting, Cuff Size: Standard)   Pulse 84   Temp 98 9 °F (37 2 °C) (Temporal)   Resp 16   Ht 4' 11 5" (1 511 m)   Wt 85 7 kg (189 lb)   BMI 37 53 kg/m²          Physical Exam   Constitutional: She appears well-developed and well-nourished  HENT:   Head: Normocephalic and atraumatic  Neck: No JVD present  Carotid bruit is not present  Cardiovascular: Normal rate and regular rhythm  Exam reveals no gallop  No murmur heard    Pulmonary/Chest: Effort normal and breath sounds normal

## 2018-03-19 NOTE — PATIENT INSTRUCTIONS
Heart Healthy Diet   WHAT YOU NEED TO KNOW:   A heart healthy diet is an eating plan low in total fat, unhealthy fats, and sodium (salt)  A heart healthy diet helps decrease your risk for heart disease and stroke  Limit the amount of fat you eat to 25% to 35% of your total daily calories  Limit sodium to less than 2,300 mg each day  DISCHARGE INSTRUCTIONS:   Healthy fats:  Healthy fats can help improve cholesterol levels  The risk for heart disease is decreased when cholesterol levels are normal  Choose healthy fats, such as the following:  · Unsaturated fat  is found in foods such as soybean, canola, olive, corn, and safflower oils  It is also found in soft tub margarine that is made with liquid vegetable oil  · Omega-3 fat  is found in certain fish, such as salmon, tuna, and trout, and in walnuts and flaxseed  Unhealthy fats:  Unhealthy fats can cause unhealthy cholesterol levels in your blood and increase your risk of heart disease  Limit unhealthy fats, such as the following:  · Cholesterol  is found in animal foods, such as eggs and lobster, and in dairy products made from whole milk  Limit cholesterol to less than 300 milligrams (mg) each day  You may need to limit cholesterol to 200 mg each day if you have heart disease  · Saturated fat  is found in meats, such as damico and hamburger  It is also found in chicken or turkey skin, whole milk, and butter  Limit saturated fat to less than 7% of your total daily calories  Limit saturated fat to less than 6% if you have heart disease or are at increased risk for it  · Trans fat  is found in packaged foods, such as potato chips and cookies  It is also in hard margarine, some fried foods, and shortening  Avoid trans fats as much as possible    Heart healthy foods and drinks to include:  Ask your dietitian or healthcare provider how many servings to have from each of the following food groups:  · Grains:      ¨ Whole-wheat breads, cereals, and pastas, and brown rice    ¨ Low-fat, low-sodium crackers and chips    · Vegetables:      ¨ Broccoli, green beans, green peas, and spinach    ¨ Collards, kale, and lima beans    ¨ Carrots, sweet potatoes, tomatoes, and peppers    ¨ Canned vegetables with no salt added    · Fruits:      ¨ Bananas, peaches, pears, and pineapple    ¨ Grapes, raisins, and dates    ¨ Oranges, tangerines, grapefruit, orange juice, and grapefruit juice    ¨ Apricots, mangoes, melons, and papaya    ¨ Raspberries and strawberries    ¨ Canned fruit with no added sugar    · Low-fat dairy products:      ¨ Nonfat (skim) milk, 1% milk, and low-fat almond, cashew, or soy milks fortified with calcium    ¨ Low-fat cheese, regular or frozen yogurt, and cottage cheese    · Meats and proteins , such as lean cuts of beef and pork (loin, leg, round), skinless chicken and turkey, legumes, soy products, egg whites, and nuts  Foods and drinks to limit or avoid:  Ask your dietitian or healthcare provider about these and other foods that are high in unhealthy fat, sodium, and sugar:  · Snack or packaged foods , such as frozen dinners, cookies, macaroni and cheese, and cereals with more than 300 mg of sodium per serving    · Canned or dry mixes  for cakes, soups, sauces, or gravies    · Vegetables with added sodium , such as instant potatoes, vegetables with added sauces, or regular canned vegetables    · Other foods high in sodium , such as ketchup, barbecue sauce, salad dressing, pickles, olives, soy sauce, and miso    · High-fat dairy foods  such as whole or 2% milk, cream cheese, or sour cream, and cheeses     · High-fat protein foods  such as high-fat cuts of beef (T-bone steaks, ribs), chicken or turkey with skin, and organ meats, such as liver    · Cured or smoked meats , such as hot dogs, damico, and sausage    · Unhealthy fats and oils , such as butter, stick margarine, shortening, and cooking oils such as coconut or palm oil    · Food and drinks high in sugar , such as soft drinks (soda), sports drinks, sweetened tea, candy, cake, cookies, pies, and doughnuts  Other diet guidelines to follow:   · Eat more foods containing omega-3 fats  Eat fish high in omega-3 fats at least 2 times a week  · Limit alcohol  Too much alcohol can damage your heart and raise your blood pressure  Women should limit alcohol to 1 drink a day  Men should limit alcohol to 2 drinks a day  A drink of alcohol is 12 ounces of beer, 5 ounces of wine, or 1½ ounces of liquor  · Choose low-sodium foods  High-sodium foods can lead to high blood pressure  Add little or no salt to food you prepare  Use herbs and spices in place of salt  · Eat more fiber  to help lower cholesterol levels  Eat at least 5 servings of fruits and vegetables each day  Eat 3 ounces of whole-grain foods each day  Legumes (beans) are also a good source of fiber  Lifestyle guidelines:   · Do not smoke  Nicotine and other chemicals in cigarettes and cigars can cause lung and heart damage  Ask your healthcare provider for information if you currently smoke and need help to quit  E-cigarettes or smokeless tobacco still contain nicotine  Talk to your healthcare provider before you use these products  · Exercise regularly  to help you maintain a healthy weight and improve your blood pressure and cholesterol levels  Ask your healthcare provider about the best exercise plan for you  Do not start an exercise program without asking your healthcare provider  Follow up with your healthcare provider as directed:  Write down your questions so you remember to ask them during your visits  © 2017 [employer] Information is for End User's use only and may not be sold, redistributed or otherwise used for commercial purposes  All illustrations and images included in CareNotes® are the copyrighted property of A [employer]  or Rob Duncan  The above information is an  only   It is not intended as medical advice for individual conditions or treatments  Talk to your doctor, nurse or pharmacist before following any medical regimen to see if it is safe and effective for you

## 2018-04-18 DIAGNOSIS — E78.2 MIXED HYPERLIPIDEMIA: ICD-10-CM

## 2018-04-18 RX ORDER — PRAVASTATIN SODIUM 20 MG
20 TABLET ORAL DAILY
Qty: 90 TABLET | Refills: 0 | Status: SHIPPED | OUTPATIENT
Start: 2018-04-18 | End: 2018-07-15 | Stop reason: SDUPTHER

## 2018-04-18 NOTE — TELEPHONE ENCOUNTER
Pt called in for refill on pravastatin 20 mg 90 days supply sent to [facility] on file

## 2018-05-25 ENCOUNTER — OFFICE VISIT (OUTPATIENT)
Dept: OBGYN CLINIC | Facility: MEDICAL CENTER | Age: 58
End: 2018-05-25
Payer: COMMERCIAL

## 2018-05-25 VITALS
DIASTOLIC BLOOD PRESSURE: 61 MMHG | HEART RATE: 73 BPM | SYSTOLIC BLOOD PRESSURE: 130 MMHG | WEIGHT: 184.6 LBS | BODY MASS INDEX: 36.66 KG/M2

## 2018-05-25 DIAGNOSIS — M67.441 GANGLION OF RIGHT HAND: ICD-10-CM

## 2018-05-25 DIAGNOSIS — S89.92XD INJURY OF LEFT KNEE, SUBSEQUENT ENCOUNTER: Primary | ICD-10-CM

## 2018-05-25 PROCEDURE — 99213 OFFICE O/P EST LOW 20 MIN: CPT | Performed by: ORTHOPAEDIC SURGERY

## 2018-05-25 RX ORDER — DESONIDE 0.5 MG/G
CREAM TOPICAL
Refills: 0 | COMMUNITY
Start: 2018-03-28 | End: 2022-07-12 | Stop reason: ALTCHOICE

## 2018-05-25 NOTE — PROGRESS NOTES
Orthopaedic Surgery - Office Note  Kimberlyn Sher (1 y o  female)   : 1960   MRN: [id number]  Encounter Date: 2018    Chief Complaint   Patient presents with    Left Knee - Follow-up    Left Hand - Follow-up       Assessment / Plan  L knee pain secondary to following January and osteoarthritis in the medial aspect of her knee  Cyst in her right wrist which at this time is non symptomatic but palpable  · Treatment of osteoarthritis of the knee was reviewed with the patient at this time including NSAIDs or analgesics as needed, icing, physical therapy for strengthening of the hip and knee muscles, bracing, and injections as needed  · Continue with activity as tolerated at this time  · The patient will call if she would like to try a different elastic sleeve were she feels that the pain worsens to where she would consider an injection  · NSAIDS or tylenol prn pain  Return if symptoms worsen or fail to improve  Patient was instructed that it is safe to leave her wrist cyst alone at this point and that if she ever wants to surgically remove it that this is an option  She does have a wrist splint which she wears on occasion and that helps a calmed down  History of Present Illness  Kimberlyn Sher is a 60 y o  female who presents today to follow up L knee pain that started the last week of 2018 as a results of falling when walking the dogs  Since last visit the patient has progressed to being able to walk without pain  She still has some pain in the anterior medial aspect of the knee especially with kneeling and complains of generalized soreness later in the day at night  She still does ice her knees but has not needed to take any medication as her pain continues to improve  She does have a knee sleeve she body at [facility] but feels that it does not fit her right so she does not wear it   Overall since her fall she feels that she has improved greatly and would like to continue with the treatment she is doing at this time  She is also here to discuss her right wrist pain and swelling on the dorsal aspect over the extensor tendon of the 4th digit where she gets swelling in the area of the cyst   She feels at this point it is palpable but does not cause any pain  On occasion when it does get sore she has wear the splint and seems to help a calmed down  Review of Systems  A comprehensive review of systems was negative except for:  HEENT: positive for hearing loss    Physical Exam  /61   Pulse 73   Wt 83 7 kg (184 lb 9 6 oz)   BMI 36 66 kg/m²   Cons: Appears well  No apparent distress  Psych: Alert  Oriented x3  Mood and affect normal   Eyes: PERRLA, EOMI  Resp: Normal effort  No audible wheezing or stridor  CV: Palpable pulse  No discernable arrhythmia  No LE edema  Lymph:  No palpable cervical, axillary, or inguinal lymphadenopathy  Skin: Warm  No palpable masses  No visible lesions  Neuro: Normal muscle tone  Normal and symmetric DTR's  Left Knee Exam  Alignment:  Normal knee alignment  Inspection:  mild swelling  No edema  No erythema  No ecchymosis  No muscle atrophy  No deformity  Palpation:  moderate tenderness at medial and lateral joint line  ROM:  Normal knee ROM  Strength:  5/5 quadriceps and hamstrings  Stability:  No objective knee instability  Stable Varus / Valgus stress, Lachman, and Posterior drawer  Tests:  (+) Lurdes  Patella:  Patella tracks centrally without crepitus  Neurovascular:  Sensation intact in DP/SP/Harepr/Sa/T nerve distributions  2+ DP & PT pulses  Gait:  Antalgic  Left Hand & Wrist Exam  Alignment:  Normal resting hand posture  Inspection:  No swelling  No edema  No erythema  No ecchymosis  No muscle atrophy  ganglion cyst of dorsal aspect of hand  Palpation:  No tenderness  ROM:  Normal wrist ROM  Normal finger ROM  Strength:  5/5  and pinch  Stability:  No objective hand or wrist instability    Tests:  No pertinent positive or negative tests  Neurovascular:  Sensation intact in Ax/R/M/U nerve distributions  2+ radial pulse  Brisk capillary refill in all fingertips  Studies Reviewed  I have personally reviewed pertinent films in PACS  MRI of left knee - medial femoral condyle insufficiency fracture and medial meniscus posterior root tear here are also wearing changes consistent with mild to moderate osteoarthritis in the medial compartment noted on the MRI consistent with her x-ray findings  Procedures  No procedures today  Medical, Surgical, Family, and Social History  The patient's medical history, family history, and social history, were reviewed and updated as appropriate      Past Medical History:   Diagnosis Date    Hearing loss     Stroke [facility])        Past Surgical History:   Procedure Laterality Date    GALLBLADDER SURGERY         Family History   Problem Relation Age of Onset    Diabetes Mother     Alcohol abuse Mother     Alcohol abuse Sister     Alcohol abuse Brother     Stroke Family     Hypertension Family        Social History     Occupational History    retired      Social History Main Topics    Smoking status: Never Smoker    Smokeless tobacco: Never Used    Alcohol use Yes      Comment: social    Drug use: Unknown    Sexual activity: Not on file       Allergies   Allergen Reactions    Codeine Polt-Chlorphen Polt Er     Penicillins Nausea Only    Pravastatin Myalgia         Current Outpatient Prescriptions:     aspirin 81 mg chewable tablet, Chew 1 tablet (81 mg total) daily, Disp: 30 tablet, Rfl: 3    clindamycin (CLEOCIN) 300 MG capsule, as needed, Disp: , Rfl:     [employer] NON-ASPIRIN EXTRA STRENGTH 500 MG tablet, TAKE 2 TABLET 4 TIMES DAILY AS NEEDED, Disp: 14 tablet, Rfl: 0    desonide (DESOWEN) 0 05 % cream, USE TWICE DAILY X 3 DAYS THEN ONCE DAILY X 3 DAYS ON UPPER EYELIDS AS NEEDED, Disp: , Rfl: 0    lisinopril (ZESTRIL) 10 mg tablet, Take 1 tablet by mouth daily, Disp: , Rfl:   montelukast (SINGULAIR) 10 mg tablet, Take 1 tablet (10 mg total) by mouth daily at bedtime for 90 days, Disp: 90 tablet, Rfl: 3    Olopatadine HCl (PAZEO OP), Apply 0 5 mL to eye, Disp: , Rfl:     pravastatin (PRAVACHOL) 20 mg tablet, Take 1 tablet (20 mg total) by mouth daily for 90 days, Disp: 90 tablet, Rfl: 0    PROAIR  (90 Base) MCG/ACT inhaler, Inhale 2 puffs every 4 (four) hours as needed, Disp: , Rfl: 2      Wade Cox PA-C    Scribe Attestation    I,:    am acting as a scribe while in the presence of the attending physician :        I,:    personally performed the services described in this documentation    as scribed in my presence :

## 2018-06-06 ENCOUNTER — TELEPHONE (OUTPATIENT)
Dept: FAMILY MEDICINE CLINIC | Facility: CLINIC | Age: 58
End: 2018-06-06

## 2018-06-06 RX ORDER — CLINDAMYCIN HYDROCHLORIDE 300 MG/1
300 CAPSULE ORAL
Qty: 30 CAPSULE | Refills: 3 | Status: CANCELLED | OUTPATIENT
Start: 2018-06-06 | End: 2018-07-06

## 2018-06-06 NOTE — TELEPHONE ENCOUNTER
Wife will need renewal of her clindamycin hcl 300mg tab  She takes 2 capsules before she goes to the dentist  Mei Hammonds stated they normally get a 6 month supply

## 2018-06-07 DIAGNOSIS — Z79.2 NEED FOR PROPHYLACTIC ANTIBIOTIC: Primary | ICD-10-CM

## 2018-06-08 RX ORDER — CLINDAMYCIN HYDROCHLORIDE 300 MG/1
300 CAPSULE ORAL ONCE
Qty: 2 CAPSULE | Refills: 1 | Status: SHIPPED | OUTPATIENT
Start: 2018-06-08 | End: 2018-06-08

## 2018-07-15 DIAGNOSIS — E78.2 MIXED HYPERLIPIDEMIA: ICD-10-CM

## 2018-07-15 RX ORDER — PRAVASTATIN SODIUM 20 MG
TABLET ORAL
Qty: 90 TABLET | Refills: 0 | Status: SHIPPED | OUTPATIENT
Start: 2018-07-15 | End: 2018-10-15 | Stop reason: SDUPTHER

## 2018-10-15 DIAGNOSIS — E78.2 MIXED HYPERLIPIDEMIA: ICD-10-CM

## 2018-10-15 RX ORDER — PRAVASTATIN SODIUM 20 MG
TABLET ORAL
Qty: 90 TABLET | Refills: 0 | Status: SHIPPED | OUTPATIENT
Start: 2018-10-15 | End: 2018-11-19 | Stop reason: SDUPTHER

## 2018-10-18 DIAGNOSIS — IMO0002 PROPHYLACTIC ANTIBIOTIC FOR DENTAL PROCEDURE INDICATED DUE TO PRIOR JOINT REPLACEMENT: Primary | ICD-10-CM

## 2018-10-18 DIAGNOSIS — I10 ESSENTIAL HYPERTENSION: ICD-10-CM

## 2018-10-18 NOTE — TELEPHONE ENCOUNTER
Patient needs clindamycin 300 mg cap before dentist she is suppose to take 1/2 hour before cleaning   Patient last seen: 03/2018

## 2018-10-19 DIAGNOSIS — I10 ESSENTIAL HYPERTENSION: ICD-10-CM

## 2018-10-19 RX ORDER — ASPIRIN 81 MG/1
81 TABLET, CHEWABLE ORAL DAILY
Qty: 90 TABLET | Refills: 1 | Status: SHIPPED | OUTPATIENT
Start: 2018-10-19 | End: 2019-09-03 | Stop reason: SDUPTHER

## 2018-10-19 RX ORDER — CLINDAMYCIN HYDROCHLORIDE 300 MG/1
CAPSULE ORAL
Qty: 4 CAPSULE | Refills: 0 | Status: SHIPPED | OUTPATIENT
Start: 2018-10-19 | End: 2019-09-03 | Stop reason: SDUPTHER

## 2018-10-19 RX ORDER — LORATADINE 10 MG
81 TABLET ORAL DAILY
Qty: 30 TABLET | Refills: 3 | Status: SHIPPED | OUTPATIENT
Start: 2018-10-19 | End: 2018-10-19

## 2018-11-09 DIAGNOSIS — I10 ESSENTIAL HYPERTENSION: Primary | ICD-10-CM

## 2018-11-09 RX ORDER — LISINOPRIL 10 MG/1
TABLET ORAL
Qty: 90 TABLET | Refills: 0 | Status: SHIPPED | OUTPATIENT
Start: 2018-11-09 | End: 2019-02-20 | Stop reason: SDUPTHER

## 2018-11-16 DIAGNOSIS — Z12.11 SCREENING FOR COLON CANCER: Primary | ICD-10-CM

## 2018-11-16 DIAGNOSIS — Z12.39 SCREENING FOR BREAST CANCER: ICD-10-CM

## 2018-11-19 ENCOUNTER — OFFICE VISIT (OUTPATIENT)
Dept: FAMILY MEDICINE CLINIC | Facility: CLINIC | Age: 58
End: 2018-11-19
Payer: COMMERCIAL

## 2018-11-19 VITALS
HEART RATE: 64 BPM | WEIGHT: 151.4 LBS | TEMPERATURE: 97.6 F | BODY MASS INDEX: 29.72 KG/M2 | SYSTOLIC BLOOD PRESSURE: 100 MMHG | RESPIRATION RATE: 18 BRPM | HEIGHT: 60 IN | DIASTOLIC BLOOD PRESSURE: 70 MMHG

## 2018-11-19 DIAGNOSIS — M79.671 PAIN OF RIGHT HEEL: ICD-10-CM

## 2018-11-19 DIAGNOSIS — Z23 ENCOUNTER FOR IMMUNIZATION: Primary | ICD-10-CM

## 2018-11-19 DIAGNOSIS — E78.2 MIXED HYPERLIPIDEMIA: ICD-10-CM

## 2018-11-19 DIAGNOSIS — Z00.00 WELL ADULT EXAM: ICD-10-CM
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
## 2018-11-19 PROBLEM — R52 PAIN: Status: ACTIVE | Noted: 2018-11-19

## 2018-11-19 PROCEDURE — 99396 PREV VISIT EST AGE 40-64: CPT | Performed by: FAMILY MEDICINE

## 2018-11-19 PROCEDURE — 90682 RIV4 VACC RECOMBINANT DNA IM: CPT

## 2018-11-19 PROCEDURE — 90471 IMMUNIZATION ADMIN: CPT

## 2018-11-19 RX ORDER — AMOXICILLIN 500 MG
CAPSULE ORAL
COMMUNITY

## 2018-11-19 RX ORDER — PRAVASTATIN SODIUM 20 MG
20 TABLET ORAL DAILY
Qty: 90 TABLET | Refills: 0 | Status: SHIPPED | OUTPATIENT
Start: 2018-11-19 | End: 2019-01-28 | Stop reason: SDUPTHER

## 2018-11-19 NOTE — PROGRESS NOTES
Assessment/Plan     Healthy female exam      Here for physical  2  Patient Counseling:  --Nutrition: Stressed importance of moderation in sodium/caffeine intake, saturated fat and cholesterol, caloric balance, sufficient intake of fresh fruits, vegetables, fiber, calcium,  --  --Exercise: Stressed the importance of regular exercise  --Substance Abuse: no concerns   --Sexuality:no concerns  --Injury prevention: Discussed safety belts, safety helmets, smoke detector  --Dental health: Discussed importance of regular tooth brushing, flossing, and dental visits  UTD dental and eye exam  --Immunizations reviewed  --Discussed benefits of screening colonoscopy-had 2 years, mammo   --After hours service discussed with patient    3  Discussed the patient's BMI with her  The BMI is above average; BMI management plan is completed  4  Follow up in one year  Subjective     John Borrego is a 60 y o  female and is here for a comprehensive physical exam  The patient reports no problems  Do you take any herbs or supplements that were not prescribed by a doctor? no  Are you taking calcium supplements? no  Are you taking aspirin daily? no     History:  LMP: No LMP recorded  Patient is postmenopausal   Menopause at 51 years  Last pap date: 2 years   Abnormal pap? no  : 2   Para: 2    The following portions of the patient's history were reviewed and updated as appropriate: allergies, current medications, past family history, past medical history, past social history, past surgical history and problem list   no concerns    Review of Systems  Do you have pain that bothers you in your daily life? yes  Musculoskeletal:positive for foot pain      Objective     /70 (BP Location: Left arm, Patient Position: Sitting, Cuff Size: Adult)   Pulse 64   Temp 97 6 °F (36 4 °C) (Temporal)   Resp 18   Ht 4' 11 5" (1 511 m)   Wt 68 7 kg (151 lb 6 4 oz)   BMI 30 07 kg/m²     General Appearance:    Alert, cooperative, no distress, appears stated age   Head:    Normocephalic, without obvious abnormality, atraumatic   Eyes:     Ears:    Normal TM's and external ear canals, both ears   Nose:   Nares normal, septum midline, mucosa normal, no drainage    or sinus tenderness   Throat:   Lips, mucosa, and tongue normal; teeth and gums normal   Neck:   Supple, symmetrical, trachea midline, no adenopathy;     thyroid:  no enlargement/tenderness/nodules; no carotid    bruit or JVD   Back:     Symmetric, no curvature, ROM normal, no CVA tenderness   Lungs:     Clear to auscultation bilaterally, respirations unlabored   Chest Wall:    No tenderness or deformity    Heart:    Regular rate and rhythm, S1 and S2 normal, no murmur, rub   or gallop   Breast Exam:     Abdomen:     Soft, non-tender, bowel sounds active all four quadrants,     no masses, no organomegaly   Genitalia:      Rectal:     Extremities:   Extremities normal, atraumatic, no cyanosis or edema   Pulses:   2+ and symmetric all extremities   Skin:   Skin color, texture, turgor normal, no rashes or lesions   Lymph nodes:   Cervical, supraclavicular, and axillary nodes normal   Neurologic:   r heel tender to touch

## 2018-12-12 ENCOUNTER — TELEPHONE (OUTPATIENT)
Dept: FAMILY MEDICINE CLINIC | Facility: CLINIC | Age: 58
End: 2018-12-12

## 2018-12-17 ENCOUNTER — CLINICAL SUPPORT (OUTPATIENT)
Dept: FAMILY MEDICINE CLINIC | Facility: CLINIC | Age: 58
End: 2018-12-17
Payer: COMMERCIAL

## 2018-12-17 DIAGNOSIS — Z23 NEED FOR SHINGLES VACCINE: ICD-10-CM

## 2018-12-17 DIAGNOSIS — Z23 ENCOUNTER FOR IMMUNIZATION: Primary | ICD-10-CM

## 2018-12-17 DIAGNOSIS — Z23 NEED FOR PNEUMOCOCCAL VACCINATION: Primary | ICD-10-CM

## 2018-12-17 PROCEDURE — 90472 IMMUNIZATION ADMIN EACH ADD: CPT

## 2018-12-17 PROCEDURE — 90732 PPSV23 VACC 2 YRS+ SUBQ/IM: CPT

## 2018-12-17 PROCEDURE — 90471 IMMUNIZATION ADMIN: CPT

## 2018-12-17 PROCEDURE — 90750 HZV VACC RECOMBINANT IM: CPT

## 2019-01-04 ENCOUNTER — APPOINTMENT (OUTPATIENT)
Dept: RADIOLOGY | Facility: MEDICAL CENTER | Age: 59
End: 2019-01-04
Payer: COMMERCIAL

## 2019-01-04 DIAGNOSIS — M79.671 PAIN OF RIGHT HEEL: ICD-10-CM

## 2019-01-04 PROCEDURE — 73630 X-RAY EXAM OF FOOT: CPT

## 2019-01-09 ENCOUNTER — TELEPHONE (OUTPATIENT)
Dept: FAMILY MEDICINE CLINIC | Facility: CLINIC | Age: 59
End: 2019-01-09

## 2019-01-09 DIAGNOSIS — M77.30 CALCANEAL SPUR, UNSPECIFIED LATERALITY: Primary | ICD-10-CM

## 2019-01-09 NOTE — TELEPHONE ENCOUNTER
----- Message from Marina Charles MD sent at 1/7/2019 11:44 AM EST -----  Positive heel spur-rec podiatry eval

## 2019-01-11 DIAGNOSIS — E78.2 MIXED HYPERLIPIDEMIA: ICD-10-CM

## 2019-01-11 RX ORDER — PRAVASTATIN SODIUM 20 MG
TABLET ORAL
Qty: 90 TABLET | Refills: 0 | Status: SHIPPED | OUTPATIENT
Start: 2019-01-11 | End: 2019-11-02 | Stop reason: SDUPTHER

## 2019-01-28 DIAGNOSIS — E78.2 MIXED HYPERLIPIDEMIA: ICD-10-CM

## 2019-01-28 RX ORDER — PRAVASTATIN SODIUM 20 MG
20 TABLET ORAL DAILY
Qty: 90 TABLET | Refills: 1 | Status: SHIPPED | OUTPATIENT
Start: 2019-01-28 | End: 2019-11-21 | Stop reason: CLARIF

## 2019-01-28 NOTE — TELEPHONE ENCOUNTER
Pt spouse called requesting med refill for pt pravastatin 20mg to be sent to [facility] pharmacy

## 2019-02-15 DIAGNOSIS — M77.30 CALCANEAL SPUR, UNSPECIFIED LATERALITY: Primary | ICD-10-CM

## 2019-02-20 DIAGNOSIS — I10 ESSENTIAL HYPERTENSION: ICD-10-CM

## 2019-02-20 RX ORDER — LISINOPRIL 10 MG/1
TABLET ORAL
Qty: 90 TABLET | Refills: 1 | Status: SHIPPED | OUTPATIENT
Start: 2019-02-20 | End: 2019-08-28 | Stop reason: SDUPTHER

## 2019-03-13 ENCOUNTER — TRANSCRIBE ORDERS (OUTPATIENT)
Dept: PHYSICAL THERAPY | Facility: MEDICAL CENTER | Age: 59
End: 2019-03-13

## 2019-03-13 ENCOUNTER — EVALUATION (OUTPATIENT)
Dept: PHYSICAL THERAPY | Facility: MEDICAL CENTER | Age: 59
End: 2019-03-13
Payer: COMMERCIAL

## 2019-03-13 DIAGNOSIS — M67.879 OTH DISRD OF SYNOVIUM AND TENDON, UNSPECIFIED ANKLE AND FOOT: Primary | ICD-10-CM

## 2019-03-13 PROCEDURE — 97110 THERAPEUTIC EXERCISES: CPT | Performed by: PHYSICAL THERAPIST

## 2019-03-13 PROCEDURE — 97161 PT EVAL LOW COMPLEX 20 MIN: CPT | Performed by: PHYSICAL THERAPIST

## 2019-03-13 NOTE — LETTER
2019    Raulito Moscoso DPM  [address] [place] [address]    Patient: Marissa Arevalo   YOB: 1960   Date of Visit: 3/13/2019     Encounter Diagnosis     ICD-10-CM    1  Oth disrd of synovium and tendon, unspecified ankle and foot M67 879        Dear Dr Silvio Reich:    Please review the attached Plan of Care from [address] [place] [address] recent visit  Please verify that you agree therapy should continue by signing the attached document and sending it back to our office  If you have any questions or concerns, please don't hesitate to call  Sincerely,    Vladimir Quiñonez, PT      Referring Provider:      I certify that I have read the below Plan of Care and certify the need for these services furnished under this plan of treatment while under my care  Raulito Moscoso DPM  [address]: [phone]          PT Evaluation     Today's date: 3/13/2019  Patient name: Marissa Arevalo  : 1960  MRN: [id number]  Referring provider: Nicole Paul DPM  Dx:   Encounter Diagnosis     ICD-10-CM    1  Oth disrd of synovium and tendon, unspecified ankle and foot M67 878                   Assessment  Assessment details: Marissa Arevalo is a 60 y o  female presents with R Achilles Tendinopathy  Marissa Arevalo has the above listed impairments and will benefit from skilled PT to improve deficits to return to prior level of function     Impairments: abnormal muscle firing, abnormal or restricted ROM, impaired physical strength and pain with function  Understanding of Dx/Px/POC: good   Prognosis: good    Goals  Impairment Goals  - Decrease pain to 0-2/10  - Improve flexibility equal to contralateral side  - Increase strength equal to contralateral side    Functional Goals  - Patient will be independent with comprehensive HEP  - Ambulation is improved to prior level of function  - Standing improved to prior level of function      Plan  Patient would benefit from: skilled PT  Referral necessary: No  Planned therapy interventions: home exercise program, manual therapy, neuromuscular re-education, patient education, functional ROM exercises, strengthening, stretching and joint mobilization  Frequency: 2x week  Duration in weeks: 12  Treatment plan discussed with: patient        Subjective Evaluation    History of Present Illness  Mechanism of injury: Fatoumata Cox reports pain in her R Achilles for about a year and it has been progressively worsening  She went to Dr Truong Tomas who diagnosed her with Achilles Tendinopathy and referred her to PT  She has been wearing heel lifts in her sneakers which have helped slightly  Currently has pain with walking and standing  Pain  Current pain ratin  At best pain ratin  At worst pain ratin    Patient Goals  Patient goals for therapy: decreased pain and increased strength          Objective     Tenderness     Right Ankle/Foot   Tenderness in the Achilles insertion and proximal Achilles       Active Range of Motion     Right Ankle/Foot   Dorsiflexion (ke): 5 degrees   Plantar flexion: 45 degrees   Inversion: 20 degrees   Eversion: 10 degrees     Strength/Myotome Testing     Right Ankle/Foot   Dorsiflexion: 4  Plantar flexion: 4  Inversion: 4  Eversion: 4          Precautions: none    Daily Treatment Diary     Manual  3/13                                                                                 Exercise Diary              gastroc stretch gr strap 30"x2            HS stretch gr strap 30"x2            Soleus stretch gr strap             gastroc stretch off step             Ankle TB 4 day             Heel raises             Toe raises                                                                                                                                                                                          Modalities

## 2019-03-13 NOTE — PROGRESS NOTES
PT Evaluation     Today's date: 3/13/2019  Patient name: Bashir Brandt  : 1960  MRN: [id number]  Referring provider: Familia Monge DPM  Dx:   Encounter Diagnosis     ICD-10-CM    1  Oth disrd of synovium and tendon, unspecified ankle and foot M67 879                   Assessment  Assessment details: Bashir Brandt is a 60 y o  female presents with R Achilles Tendinopathy  Bashir Brandt has the above listed impairments and will benefit from skilled PT to improve deficits to return to prior level of function  Impairments: abnormal muscle firing, abnormal or restricted ROM, impaired physical strength and pain with function  Understanding of Dx/Px/POC: good   Prognosis: good    Goals  Impairment Goals  - Decrease pain to 0-2/10  - Improve flexibility equal to contralateral side  - Increase strength equal to contralateral side    Functional Goals  - Patient will be independent with comprehensive HEP  - Ambulation is improved to prior level of function  - Standing improved to prior level of function      Plan  Patient would benefit from: skilled PT  Referral necessary: No  Planned therapy interventions: home exercise program, manual therapy, neuromuscular re-education, patient education, functional ROM exercises, strengthening, stretching and joint mobilization  Frequency: 2x week  Duration in weeks: 12  Treatment plan discussed with: patient        Subjective Evaluation    History of Present Illness  Mechanism of injury: Marian Stapleton reports pain in her R Achilles for about a year and it has been progressively worsening  She went to Dr Halima Arreguin who diagnosed her with Achilles Tendinopathy and referred her to PT  She has been wearing heel lifts in her sneakers which have helped slightly  Currently has pain with walking and standing     Pain  Current pain ratin  At best pain ratin  At worst pain ratin    Patient Goals  Patient goals for therapy: decreased pain and increased strength          Objective Tenderness     Right Ankle/Foot   Tenderness in the Achilles insertion and proximal Achilles       Active Range of Motion     Right Ankle/Foot   Dorsiflexion (ke): 5 degrees   Plantar flexion: 45 degrees   Inversion: 20 degrees   Eversion: 10 degrees     Strength/Myotome Testing     Right Ankle/Foot   Dorsiflexion: 4  Plantar flexion: 4  Inversion: 4  Eversion: 4          Precautions: none    Daily Treatment Diary     Manual  3/13                                                                                 Exercise Diary              gastroc stretch gr strap 30"x2            HS stretch gr strap 30"x2            Soleus stretch gr strap             gastroc stretch off step             Ankle TB 4 day             Heel raises             Toe raises                                                                                                                                                                                          Modalities

## 2019-03-20 ENCOUNTER — APPOINTMENT (OUTPATIENT)
Dept: PHYSICAL THERAPY | Facility: MEDICAL CENTER | Age: 59
End: 2019-03-20
Payer: COMMERCIAL

## 2019-03-27 ENCOUNTER — OFFICE VISIT (OUTPATIENT)
Dept: PHYSICAL THERAPY | Facility: MEDICAL CENTER | Age: 59
End: 2019-03-27
Payer: COMMERCIAL

## 2019-03-27 DIAGNOSIS — M67.879 OTH DISRD OF SYNOVIUM AND TENDON, UNSPECIFIED ANKLE AND FOOT: Primary | ICD-10-CM

## 2019-03-27 PROCEDURE — 97110 THERAPEUTIC EXERCISES: CPT | Performed by: PHYSICAL THERAPIST

## 2019-03-27 NOTE — PROGRESS NOTES
Additional Information: (Optional): The wound was cleaned, and a pressure dressing was applied.  The patient received detailed post-op instructions. Daily Note     Today's date: 3/27/2019  Patient name: Yasmine Mascorro  : 1960  MRN: [id number]  Referring provider: Renetta Hicks DPM  Dx:   Encounter Diagnosis     ICD-10-CM    1  Oth disrd of synovium and tendon, unspecified ankle and foot M67 879                   Subjective: Kerry Contreras arrived 27' late 2* difficulty finding clinic  Reports she feels a little better after doing her HEP      Objective: See treatment diary below      Assessment: Tolerated treatment well  Patient would benefit from continued PT      Plan: Progress treatment as tolerated      Precautions: none    Daily Treatment Diary     Manual  3/13 3/27                                                                                Exercise Diary              gastroc stretch gr strap 30"x2            HS stretch gr strap 30"x2            Soleus stretch gr strap  30"x2           gastroc stretch off step  30"x2           Ankle TB 4 day             Heel raises  2x10           Toe raises  2x10                                                                                                                                                                                        Modalities Post-Care Instructions: I reviewed with the patient in detail post-care instructions. Patient is to keep the area dry for 48 hours, and not to engage in any swimming until the area is healed. Should the patient develop any fevers, chills, bleeding, severe pain patient will contact the office immediately. Render Post-Care Instructions In Note?: no Anesthesia Volume In Cc: 3 Bill For Surgical Tray: yes Consent was obtained from the patient. The risks, benefits and alternatives to therapy were discussed in detail. Specifically, the risks of infection, scarring, bleeding, prolonged wound healing, nerve injury, incomplete removal, allergy to anesthesia and recurrence were addressed. Alternatives to ED&C, such as: surgical removal and XRT were also discussed.  Prior to the procedure, the treatment site was clearly identified and confirmed by the patient. All components of Universal Protocol/PAUSE Rule completed. Cautery Type: electrodesiccation Total Volume (Ccs): 1 Bill As A Line Item Or As Units: Line Item Detail Level: Detailed Anesthesia Type: 1% lidocaine with epinephrine Size Of Lesion After Curettage: 2.1 What Was Performed First?: Curettage

## 2019-04-05 ENCOUNTER — OFFICE VISIT (OUTPATIENT)
Dept: PHYSICAL THERAPY | Facility: MEDICAL CENTER | Age: 59
End: 2019-04-05
Payer: COMMERCIAL

## 2019-04-05 DIAGNOSIS — M67.879 OTH DISRD OF SYNOVIUM AND TENDON, UNSPECIFIED ANKLE AND FOOT: Primary | ICD-10-CM

## 2019-04-05 PROCEDURE — 97110 THERAPEUTIC EXERCISES: CPT | Performed by: PHYSICAL THERAPIST

## 2019-08-28 DIAGNOSIS — I10 ESSENTIAL HYPERTENSION: ICD-10-CM

## 2019-08-28 RX ORDER — LISINOPRIL 10 MG/1
10 TABLET ORAL DAILY
Qty: 30 TABLET | Refills: 5 | Status: SHIPPED | OUTPATIENT
Start: 2019-08-28 | End: 2020-02-07 | Stop reason: SDUPTHER

## 2019-09-03 ENCOUNTER — TELEPHONE (OUTPATIENT)
Dept: FAMILY MEDICINE CLINIC | Facility: CLINIC | Age: 59
End: 2019-09-03

## 2019-09-03 DIAGNOSIS — I10 ESSENTIAL HYPERTENSION: ICD-10-CM

## 2019-09-03 DIAGNOSIS — Z79.2 PROPHYLACTIC ANTIBIOTIC: ICD-10-CM

## 2019-09-03 RX ORDER — CLINDAMYCIN HYDROCHLORIDE 300 MG/1
CAPSULE ORAL
Qty: 4 CAPSULE | Refills: 0 | Status: SHIPPED | OUTPATIENT
Start: 2019-09-03 | End: 2019-09-06

## 2019-09-03 RX ORDER — LORATADINE 10 MG
TABLET ORAL
Qty: 90 TABLET | Refills: 1 | Status: SHIPPED | OUTPATIENT
Start: 2019-09-03 | End: 2020-03-05 | Stop reason: SDUPTHER

## 2019-09-03 NOTE — TELEPHONE ENCOUNTER
PATIENT IS GOING TO THE DENTIST TOMORROW MORNING AT 8:30 AND SHE IS ASKING FOR [facility]IN TO [facility] [address]  THANK YOU!

## 2019-10-25 ENCOUNTER — TELEPHONE (OUTPATIENT)
Dept: FAMILY MEDICINE CLINIC | Facility: CLINIC | Age: 59
End: 2019-10-25

## 2019-10-25 DIAGNOSIS — E78.2 MIXED HYPERLIPIDEMIA: ICD-10-CM

## 2019-10-25 RX ORDER — PRAVASTATIN SODIUM 20 MG
20 TABLET ORAL DAILY
Qty: 90 TABLET | Refills: 3 | Status: CANCELLED | OUTPATIENT
Start: 2019-10-25

## 2019-10-25 NOTE — TELEPHONE ENCOUNTER
PATIENT CAME IN TO OFFICE SHE NEEDS A REFILL ON "PRAVASTATIN 20 MG" PATIENT IS GOING ON VACATION NEXT WEEK AND WHEN SHE RETURNS SHE WILL NOT HAVE ANYMORE MEDICATION LEFT  PT WOULD LIKE SCRIPT SENT TO [address]   PLEASE CALL PATIENT [phone] WHEN SCRIPT IS SENT

## 2019-11-02 DIAGNOSIS — E78.2 MIXED HYPERLIPIDEMIA: ICD-10-CM

## 2019-11-03 RX ORDER — PRAVASTATIN SODIUM 20 MG
TABLET ORAL
Qty: 90 TABLET | Refills: 0 | Status: SHIPPED | OUTPATIENT
Start: 2019-11-03 | End: 2019-11-21 | Stop reason: SDUPTHER

## 2019-11-08 ENCOUNTER — OFFICE VISIT (OUTPATIENT)
Dept: FAMILY MEDICINE CLINIC | Facility: CLINIC | Age: 59
End: 2019-11-08
Payer: COMMERCIAL

## 2019-11-08 VITALS
BODY MASS INDEX: 36.66 KG/M2 | WEIGHT: 184.6 LBS | TEMPERATURE: 98.3 F | SYSTOLIC BLOOD PRESSURE: 110 MMHG | DIASTOLIC BLOOD PRESSURE: 62 MMHG | HEART RATE: 70 BPM

## 2019-11-08 DIAGNOSIS — I10 ESSENTIAL HYPERTENSION: ICD-10-CM

## 2019-11-08 DIAGNOSIS — H91.93 BILATERAL DEAFNESS: ICD-10-CM

## 2019-11-08 DIAGNOSIS — J06.9 UPPER RESPIRATORY TRACT INFECTION, UNSPECIFIED TYPE: Primary | ICD-10-CM

## 2019-11-08 PROBLEM — H91.90 DEAF: Status: ACTIVE | Noted: 2019-11-08

## 2019-11-08 PROCEDURE — 99213 OFFICE O/P EST LOW 20 MIN: CPT | Performed by: FAMILY MEDICINE

## 2019-11-08 RX ORDER — BENZONATATE 200 MG/1
200 CAPSULE ORAL 3 TIMES DAILY PRN
Qty: 20 CAPSULE | Refills: 0 | Status: SHIPPED | OUTPATIENT
Start: 2019-11-08 | End: 2020-06-18 | Stop reason: CLARIF

## 2019-11-08 RX ORDER — FLUTICASONE PROPIONATE 50 MCG
1 SPRAY, SUSPENSION (ML) NASAL DAILY
Qty: 1 BOTTLE | Refills: 0 | Status: SHIPPED | OUTPATIENT
Start: 2019-11-08 | End: 2022-07-12

## 2019-11-08 RX ORDER — KETOCONAZOLE 20 MG/G
CREAM TOPICAL
Refills: 2 | COMMUNITY
Start: 2019-10-16

## 2019-11-08 RX ORDER — CLINDAMYCIN HYDROCHLORIDE 300 MG/1
CAPSULE ORAL
COMMUNITY
End: 2020-03-10 | Stop reason: SDUPTHER

## 2019-11-08 NOTE — ASSESSMENT & PLAN NOTE
Patient's blood pressure today was quite good  No changes are needed  Continue with current treatments

## 2019-11-08 NOTE — PROGRESS NOTES
Assessment and Plan:    Problem List Items Addressed This Visit     Deaf     Patient is deaf  I did use M*Modal speech to text for some communication aspect, but also relied on her  who was here to translate for her  Hypertension     Patient's blood pressure today was quite good  No changes are needed  Continue with current treatments  Other Visit Diagnoses     Upper respiratory tract infection, unspecified type    -  Primary    Patient likely has some minor viral infection, verses bacterial   Tessalon, Flonase  Follow in 2 weeks if needed  If no improvement, z radha  Relevant Medications    benzonatate (TESSALON) 200 MG capsule                 Diagnoses and all orders for this visit:    Upper respiratory tract infection, unspecified type  Comments:  Patient likely has some minor viral infection, verses bacterial   Tessalon, Flonase  Follow in 2 weeks if needed  If no improvement, z radha  Orders:  -     fluticasone (FLONASE) 50 mcg/act nasal spray; 1 spray into each nostril daily for 10 days  -     benzonatate (TESSALON) 200 MG capsule; Take 1 capsule (200 mg total) by mouth 3 (three) times a day as needed for cough    Bilateral deafness    Essential hypertension    Other orders  -     clindamycin (CLEOCIN) 300 MG capsule; clindamycin HCl 300 mg capsule  -     ketoconazole (NIZORAL) 2 % cream; APPLY TO AFFECTED AREA OF THE FEET TWICE A DAY TILL CLEAR              Subjective:      Patient ID: Will Charles is a 60 y o  female  CC:    Chief Complaint   Patient presents with    Cough     Pt presents in the office with cough that has been ongoing on and off and states it is worse in the morning  Pt also admitted to havnig an itch to her throat   Sore Throat       HPI:    So the patient today seems to have coughing, as well as some irritation, is that correct    It has been about 3 weeks now, correct? Okay, week ago  What other symptoms do have with it?     Feverish on the way to vacation,  The next day, she was feeling better, but then the day after she started having some itchy throat  She tried fire ball, and that seemed to help a little bit  The itching went away after that, but she had more and more coughing  Do you have any postnasal drip with this, i e  Sniffing in swallowing snot  So no postnasal drip  Are you having any tooth or face pain? No face or tooth pain, good  I do not like when my face or teeth hurt  What medications have you tried so far for this? So just the fire ball  The following portions of the patient's history were reviewed and updated as appropriate: allergies, current medications, past family history, past medical history, past social history, past surgical history and problem list       Review of Systems   Constitutional: Negative  HENT:        Per HPI   Eyes: Negative  Respiratory: Negative  Cardiovascular: Negative  Gastrointestinal: Negative  All other systems reviewed and are negative  Data to review:       Objective:    Vitals:    11/08/19 1031   BP: 110/62   BP Location: Left arm   Patient Position: Sitting   Cuff Size: Adult   Pulse: 70   Temp: 98 3 °F (36 8 °C)   TempSrc: Oral   Weight: 83 7 kg (184 lb 9 6 oz)        Physical Exam   Constitutional: She appears well-developed and well-nourished  HENT:   Head: Normocephalic and atraumatic  Cardiovascular: Normal rate, regular rhythm and normal heart sounds  Pulses:       Carotid pulses are 2+ on the right side, and 2+ on the left side  Pulmonary/Chest: Effort normal and breath sounds normal  She has no wheezes  She has no rales  She exhibits no tenderness  Nursing note and vitals reviewed

## 2019-11-08 NOTE — ASSESSMENT & PLAN NOTE
Patient is deaf  I did use M*Modal speech to text for some communication aspect, but also relied on her  who was here to translate for her

## 2019-11-08 NOTE — PATIENT INSTRUCTIONS
Problem List Items Addressed This Visit     Deaf     Patient is deaf  I did use M*Modal speech to text for some communication aspect, but also relied on her  who was here to translate for her  Hypertension     Patient's blood pressure today was quite good  No changes are needed  Continue with current treatments  Other Visit Diagnoses     Upper respiratory tract infection, unspecified type    -  Primary    Patient likely has some minor viral infection, verses bacterial   Tessalon, Flonase  Follow in 2 weeks if needed  If no improvement, z radha  Relevant Medications    fluticasone (FLONASE) 50 mcg/act nasal spray    benzonatate (TESSALON) 200 MG capsule        Please note, Flonase is available over-the-counter, though I did send the prescription to the pharmacy  It may not be covered by her insurance given that it is over-the-counter  The pharmacist there should be able to help you to find the right bottle for you, and generic versions are reasonable to use

## 2019-11-21 ENCOUNTER — OFFICE VISIT (OUTPATIENT)
Dept: FAMILY MEDICINE CLINIC | Facility: CLINIC | Age: 59
End: 2019-11-21
Payer: COMMERCIAL

## 2019-11-21 VITALS
SYSTOLIC BLOOD PRESSURE: 114 MMHG | BODY MASS INDEX: 37.54 KG/M2 | HEART RATE: 72 BPM | DIASTOLIC BLOOD PRESSURE: 70 MMHG | HEIGHT: 59 IN | WEIGHT: 186.2 LBS

## 2019-11-21 DIAGNOSIS — I10 ESSENTIAL HYPERTENSION: Primary | ICD-10-CM

## 2019-11-21 DIAGNOSIS — Z11.59 ENCOUNTER FOR HEPATITIS C SCREENING TEST FOR LOW RISK PATIENT: ICD-10-CM

## 2019-11-21 DIAGNOSIS — Z23 ENCOUNTER FOR IMMUNIZATION: ICD-10-CM

## 2019-11-21 DIAGNOSIS — E03.9 HYPOTHYROIDISM, UNSPECIFIED TYPE: ICD-10-CM

## 2019-11-21 DIAGNOSIS — E78.2 MIXED HYPERLIPIDEMIA: ICD-10-CM

## 2019-11-21 PROCEDURE — 1036F TOBACCO NON-USER: CPT | Performed by: FAMILY MEDICINE

## 2019-11-21 PROCEDURE — 90471 IMMUNIZATION ADMIN: CPT

## 2019-11-21 PROCEDURE — 99213 OFFICE O/P EST LOW 20 MIN: CPT | Performed by: FAMILY MEDICINE

## 2019-11-21 PROCEDURE — 90682 RIV4 VACC RECOMBINANT DNA IM: CPT

## 2019-11-21 NOTE — PROGRESS NOTES
Assessment and Plan:    Problem List Items Addressed This Visit        Cardiovascular and Mediastinum    Hypertension - Primary     BP stable         Relevant Orders    Comprehensive metabolic panel    TSH, 3rd generation    CBC and differential    UA (URINE) with reflex to Scope       Other    Mixed hyperlipidemia     Await lab         Relevant Orders    Lipid panel    Comprehensive metabolic panel    TSH, 3rd generation      Other Visit Diagnoses     Encounter for hepatitis C screening test for low risk patient        Relevant Orders    Hepatitis C antibody                 Diagnoses and all orders for this visit:    Essential hypertension  -     Comprehensive metabolic panel; Future  -     TSH, 3rd generation; Future  -     CBC and differential; Future  -     UA (URINE) with reflex to Scope; Future    Mixed hyperlipidemia  -     Lipid panel; Future  -     Comprehensive metabolic panel; Future  -     TSH, 3rd generation; Future    Encounter for hepatitis C screening test for low risk patient  -     Hepatitis C antibody; Future              Subjective:      Patient ID: Paulette Tavera is a 60 y o  female  CC:    Chief Complaint   Patient presents with    Follow-up     pt here today with spouse for a follow up  R Sonny       HPI:    Here for f/u bP, not taking cholesterol meds, had cough[place]cold sx [place] [facility]      The following portions of the patient's history were reviewed and updated as appropriate: allergies, current medications, past family history, past medical history, past social history, past surgical history and problem list      Review of Systems   Constitutional: Negative for activity change, appetite change and fever  HENT: Positive for hearing loss  Negative for congestion  Respiratory: Positive for cough  Improving   Cardiovascular: Negative for chest pain  Gastrointestinal: Negative for constipation and diarrhea     Neurological: Negative for dizziness and headaches  Data to review:       Objective:    Vitals:    11/21/19 0842   BP: 114/70   BP Location: Left arm   Patient Position: Sitting   Cuff Size: Standard   Pulse: 72   Weight: 84 5 kg (186 lb 3 2 oz)   Height: 4' 11" (1 499 m)        Physical Exam   Constitutional: She appears well-developed and well-nourished  Neck: No thyromegaly present  Cardiovascular: Normal rate, regular rhythm and normal heart sounds  Pulmonary/Chest: Effort normal and breath sounds normal    Musculoskeletal: She exhibits no edema  Lymphadenopathy:     She has no cervical adenopathy  Vitals reviewed

## 2019-12-04 DIAGNOSIS — J06.9 UPPER RESPIRATORY TRACT INFECTION, UNSPECIFIED TYPE: ICD-10-CM

## 2019-12-04 RX ORDER — FLUTICASONE PROPIONATE 50 MCG
SPRAY, SUSPENSION (ML) NASAL
Qty: 16 ML | Refills: 0 | OUTPATIENT
Start: 2019-12-04

## 2020-01-28 LAB — HCV AB SER-ACNC: NEGATIVE

## 2020-02-07 DIAGNOSIS — I10 ESSENTIAL HYPERTENSION: ICD-10-CM

## 2020-02-09 RX ORDER — LISINOPRIL 10 MG/1
10 TABLET ORAL DAILY
Qty: 30 TABLET | Refills: 3 | Status: SHIPPED | OUTPATIENT
Start: 2020-02-09 | End: 2020-05-04

## 2020-02-13 ENCOUNTER — TELEPHONE (OUTPATIENT)
Dept: FAMILY MEDICINE CLINIC | Facility: CLINIC | Age: 60
End: 2020-02-13

## 2020-02-13 NOTE — TELEPHONE ENCOUNTER
She told us she was not taking pravastatin-not sure why she stopped, was supposed to have lab and didn't do, if concern about possible mini stroke needs eval asap-rec ER eval

## 2020-02-13 NOTE — TELEPHONE ENCOUNTER
Patient came into office states "she(patient) has T  I A  Little stroke inside her body, call neurologist" he also would like to know why pravastatin was stopped

## 2020-03-05 DIAGNOSIS — I10 ESSENTIAL HYPERTENSION: ICD-10-CM

## 2020-03-05 RX ORDER — ASPIRIN 81 MG/1
81 TABLET, CHEWABLE ORAL DAILY
Qty: 90 TABLET | Refills: 1 | Status: SHIPPED | OUTPATIENT
Start: 2020-03-05 | End: 2020-09-14

## 2020-03-10 DIAGNOSIS — Z29.8 NEED FOR SBE (SUBACUTE BACTERIAL ENDOCARDITIS) PROPHYLAXIS: Primary | ICD-10-CM

## 2020-03-10 PROBLEM — Z29.89 NEED FOR SBE (SUBACUTE BACTERIAL ENDOCARDITIS) PROPHYLAXIS: Status: ACTIVE | Noted: 2020-03-10

## 2020-03-10 RX ORDER — CLINDAMYCIN HYDROCHLORIDE 300 MG/1
CAPSULE ORAL
Qty: 2 CAPSULE | Refills: 0 | Status: SHIPPED | OUTPATIENT
Start: 2020-03-10 | End: 2020-03-23

## 2020-03-10 NOTE — TELEPHONE ENCOUNTER
The correct doses clindamycin 300 mg, 2 capsules 1-1/2 hour before teeth cleaning    This was sent to her pharmacy

## 2020-03-10 NOTE — TELEPHONE ENCOUNTER
Pt  came in because pt is having teeth cleaning and needs clindamycin hcl 300 mg capsule    Please send to [address] please

## 2020-05-04 DIAGNOSIS — I10 ESSENTIAL HYPERTENSION: ICD-10-CM

## 2020-05-04 RX ORDER — LISINOPRIL 10 MG/1
TABLET ORAL
Qty: 90 TABLET | Refills: 1 | Status: SHIPPED | OUTPATIENT
Start: 2020-05-04 | End: 2020-06-18 | Stop reason: SDUPTHER

## 2020-06-18 ENCOUNTER — OFFICE VISIT (OUTPATIENT)
Dept: FAMILY MEDICINE CLINIC | Facility: CLINIC | Age: 60
End: 2020-06-18
Payer: COMMERCIAL

## 2020-06-18 VITALS
RESPIRATION RATE: 16 BRPM | SYSTOLIC BLOOD PRESSURE: 108 MMHG | DIASTOLIC BLOOD PRESSURE: 68 MMHG | TEMPERATURE: 99 F | HEIGHT: 59 IN | HEART RATE: 72 BPM | WEIGHT: 195 LBS | BODY MASS INDEX: 39.31 KG/M2

## 2020-06-18 DIAGNOSIS — Z12.39 SCREENING FOR BREAST CANCER: ICD-10-CM

## 2020-06-18 DIAGNOSIS — R60.9 EDEMA, UNSPECIFIED TYPE: Primary | ICD-10-CM

## 2020-06-18 DIAGNOSIS — I10 ESSENTIAL HYPERTENSION: ICD-10-CM

## 2020-06-18 PROCEDURE — 3008F BODY MASS INDEX DOCD: CPT | Performed by: FAMILY MEDICINE

## 2020-06-18 PROCEDURE — 3078F DIAST BP <80 MM HG: CPT | Performed by: FAMILY MEDICINE

## 2020-06-18 PROCEDURE — 99214 OFFICE O/P EST MOD 30 MIN: CPT | Performed by: FAMILY MEDICINE

## 2020-06-18 PROCEDURE — 1036F TOBACCO NON-USER: CPT | Performed by: FAMILY MEDICINE

## 2020-06-18 PROCEDURE — 3074F SYST BP LT 130 MM HG: CPT | Performed by: FAMILY MEDICINE

## 2020-06-18 RX ORDER — LISINOPRIL 10 MG/1
10 TABLET ORAL DAILY
Qty: 90 TABLET | Refills: 1 | Status: SHIPPED | OUTPATIENT
Start: 2020-06-18 | End: 2021-02-28

## 2020-06-18 RX ORDER — HYDROCHLOROTHIAZIDE 12.5 MG/1
12.5 TABLET ORAL 2 TIMES WEEKLY
Qty: 15 TABLET | Refills: 5 | Status: SHIPPED | OUTPATIENT
Start: 2020-06-18 | End: 2021-02-28

## 2020-09-14 DIAGNOSIS — I10 ESSENTIAL HYPERTENSION: ICD-10-CM

## 2020-09-14 RX ORDER — LORATADINE 10 MG
TABLET ORAL
Qty: 30 TABLET | Refills: 5 | Status: SHIPPED | OUTPATIENT
Start: 2020-09-14 | End: 2021-03-28

## 2020-09-21 ENCOUNTER — TELEPHONE (OUTPATIENT)
Dept: FAMILY MEDICINE CLINIC | Facility: CLINIC | Age: 60
End: 2020-09-21

## 2020-09-21 NOTE — TELEPHONE ENCOUNTER
PT  CAME IN TO THE OFFICE TO REQUEST MEDICATION FOR HIS WIFE STATED SHE IS HAVING DENTAL WORK THIS WEDNESDAY AND SHE NEEDS MEDICATIONS FOR THIS STATED SHE NEEDS CLINAMYCIN  MG CAPSULE, PT  WOULD LIKE A PAPER SCRIPT AND WOULD LIKE SOMEONE TO CALL HIM WHEN READY TO   THANK YOU!

## 2020-09-22 ENCOUNTER — TELEPHONE (OUTPATIENT)
Dept: FAMILY MEDICINE CLINIC | Facility: CLINIC | Age: 60
End: 2020-09-22

## 2020-09-22 DIAGNOSIS — Z29.8 NEED FOR SUBACUTE BACTERIAL ENDOCARDITIS PROPHYLAXIS: Primary | ICD-10-CM

## 2020-09-22 RX ORDER — CLINDAMYCIN HYDROCHLORIDE 300 MG/1
CAPSULE ORAL
Qty: 6 CAPSULE | Refills: 0 | Status: SHIPPED | OUTPATIENT
Start: 2020-09-22 | End: 2020-12-22

## 2020-09-22 NOTE — TELEPHONE ENCOUNTER
Patient's  called in via a  regarding the Clinamycin HCL 300MG for their dental appointment tomorrow at 10:00 AM  Could someone please call once the medication is sent to the pharmacy? They are worried about not getting it in time  Phone: [phone]

## 2020-09-22 NOTE — TELEPHONE ENCOUNTER
Pt  called in because he wanted to know if medication was called in because wife has dentist appt tomorrow   He sd that she only takes 2  once called in please call pt  please

## 2020-10-19 ENCOUNTER — OFFICE VISIT (OUTPATIENT)
Dept: FAMILY MEDICINE CLINIC | Facility: CLINIC | Age: 60
End: 2020-10-19
Payer: COMMERCIAL

## 2020-10-19 VITALS
DIASTOLIC BLOOD PRESSURE: 68 MMHG | TEMPERATURE: 97.5 F | HEART RATE: 64 BPM | SYSTOLIC BLOOD PRESSURE: 122 MMHG | BODY MASS INDEX: 38.51 KG/M2 | HEIGHT: 59 IN | WEIGHT: 191 LBS | RESPIRATION RATE: 16 BRPM

## 2020-10-19 DIAGNOSIS — Z23 ENCOUNTER FOR IMMUNIZATION: Primary | ICD-10-CM

## 2020-10-19 DIAGNOSIS — M67.441 GANGLION OF RIGHT HAND: ICD-10-CM

## 2020-10-19 DIAGNOSIS — J45.20 MILD INTERMITTENT ASTHMA WITHOUT COMPLICATION: ICD-10-CM

## 2020-10-19 DIAGNOSIS — I10 ESSENTIAL HYPERTENSION: ICD-10-CM

## 2020-10-19 PROBLEM — R52 PAIN: Status: RESOLVED | Noted: 2018-11-19 | Resolved: 2020-10-19

## 2020-10-19 PROCEDURE — 3078F DIAST BP <80 MM HG: CPT | Performed by: FAMILY MEDICINE

## 2020-10-19 PROCEDURE — 1036F TOBACCO NON-USER: CPT | Performed by: FAMILY MEDICINE

## 2020-10-19 PROCEDURE — 90471 IMMUNIZATION ADMIN: CPT | Performed by: FAMILY MEDICINE

## 2020-10-19 PROCEDURE — 99214 OFFICE O/P EST MOD 30 MIN: CPT | Performed by: FAMILY MEDICINE

## 2020-10-19 PROCEDURE — 90682 RIV4 VACC RECOMBINANT DNA IM: CPT | Performed by: FAMILY MEDICINE

## 2020-11-02 DIAGNOSIS — Z12.39 SCREENING FOR BREAST CANCER: ICD-10-CM

## 2020-11-04 ENCOUNTER — OFFICE VISIT (OUTPATIENT)
Dept: OBGYN CLINIC | Facility: MEDICAL CENTER | Age: 60
End: 2020-11-04
Payer: COMMERCIAL

## 2020-11-04 ENCOUNTER — TRANSCRIBE ORDERS (OUTPATIENT)
Dept: ADMINISTRATIVE | Facility: HOSPITAL | Age: 60
End: 2020-11-04

## 2020-11-04 VITALS
DIASTOLIC BLOOD PRESSURE: 78 MMHG | WEIGHT: 180 LBS | TEMPERATURE: 98 F | SYSTOLIC BLOOD PRESSURE: 122 MMHG | HEART RATE: 84 BPM | HEIGHT: 59 IN | BODY MASS INDEX: 36.29 KG/M2

## 2020-11-04 DIAGNOSIS — M67.431 GANGLION CYST OF DORSUM OF RIGHT WRIST: Primary | ICD-10-CM

## 2020-11-04 DIAGNOSIS — M67.431 GANGLION CYST OF DORSUM OF RIGHT WRIST: ICD-10-CM

## 2020-11-04 DIAGNOSIS — M65.342 TRIGGER FINGER, LEFT RING FINGER: Primary | ICD-10-CM

## 2020-11-04 PROCEDURE — 20550 NJX 1 TENDON SHEATH/LIGAMENT: CPT | Performed by: ORTHOPAEDIC SURGERY

## 2020-11-04 PROCEDURE — 99244 OFF/OP CNSLTJ NEW/EST MOD 40: CPT | Performed by: ORTHOPAEDIC SURGERY

## 2020-11-04 RX ORDER — LIDOCAINE HYDROCHLORIDE 10 MG/ML
0.5 INJECTION, SOLUTION EPIDURAL; INFILTRATION; INTRACAUDAL; PERINEURAL
Status: COMPLETED | OUTPATIENT
Start: 2020-11-04 | End: 2020-11-04

## 2020-11-04 RX ORDER — BETAMETHASONE SODIUM PHOSPHATE AND BETAMETHASONE ACETATE 3; 3 MG/ML; MG/ML
3 INJECTION, SUSPENSION INTRA-ARTICULAR; INTRALESIONAL; INTRAMUSCULAR; SOFT TISSUE
Status: COMPLETED | OUTPATIENT
Start: 2020-11-04 | End: 2020-11-04

## 2020-11-04 RX ADMIN — LIDOCAINE HYDROCHLORIDE 0.5 ML: 10 INJECTION, SOLUTION EPIDURAL; INFILTRATION; INTRACAUDAL; PERINEURAL at 20:57

## 2020-11-04 RX ADMIN — BETAMETHASONE SODIUM PHOSPHATE AND BETAMETHASONE ACETATE 3 MG: 3; 3 INJECTION, SUSPENSION INTRA-ARTICULAR; INTRALESIONAL; INTRAMUSCULAR; SOFT TISSUE at 20:57

## 2020-11-06 ENCOUNTER — HOSPITAL ENCOUNTER (OUTPATIENT)
Dept: ULTRASOUND IMAGING | Facility: HOSPITAL | Age: 60
Discharge: HOME | End: 2020-11-06
Attending: ORTHOPAEDIC SURGERY

## 2020-11-06 DIAGNOSIS — M67.431 GANGLION CYST OF DORSUM OF RIGHT WRIST: ICD-10-CM

## 2020-11-11 ENCOUNTER — HOSPITAL ENCOUNTER (OUTPATIENT)
Dept: ULTRASOUND IMAGING | Facility: HOSPITAL | Age: 60
Discharge: HOME | End: 2020-11-11
Attending: ORTHOPAEDIC SURGERY
Payer: COMMERCIAL

## 2020-11-11 DIAGNOSIS — M67.431 GANGLION CYST OF DORSUM OF RIGHT WRIST: ICD-10-CM

## 2020-11-11 PROCEDURE — 76882 US LMTD JT/FCL EVL NVASC XTR: CPT

## 2020-11-12 ENCOUNTER — OFFICE VISIT (OUTPATIENT)
Dept: OBGYN CLINIC | Facility: MEDICAL CENTER | Age: 60
End: 2020-11-12
Payer: COMMERCIAL

## 2020-11-12 VITALS
WEIGHT: 189 LBS | HEIGHT: 59 IN | SYSTOLIC BLOOD PRESSURE: 103 MMHG | DIASTOLIC BLOOD PRESSURE: 68 MMHG | BODY MASS INDEX: 38.1 KG/M2 | HEART RATE: 71 BPM | TEMPERATURE: 97.6 F

## 2020-11-12 DIAGNOSIS — M67.431 GANGLION CYST OF DORSUM OF RIGHT WRIST: Primary | ICD-10-CM

## 2020-11-12 PROCEDURE — 3008F BODY MASS INDEX DOCD: CPT | Performed by: ORTHOPAEDIC SURGERY

## 2020-11-12 PROCEDURE — 1036F TOBACCO NON-USER: CPT | Performed by: ORTHOPAEDIC SURGERY

## 2020-11-12 PROCEDURE — 99213 OFFICE O/P EST LOW 20 MIN: CPT | Performed by: ORTHOPAEDIC SURGERY

## 2020-11-12 PROCEDURE — 3078F DIAST BP <80 MM HG: CPT | Performed by: ORTHOPAEDIC SURGERY

## 2020-11-12 PROCEDURE — 3074F SYST BP LT 130 MM HG: CPT | Performed by: ORTHOPAEDIC SURGERY

## 2020-11-20 ENCOUNTER — HOSPITAL ENCOUNTER (OUTPATIENT)
Dept: MRI IMAGING | Facility: HOSPITAL | Age: 60
Discharge: HOME | End: 2020-11-20
Attending: ORTHOPAEDIC SURGERY
Payer: COMMERCIAL

## 2020-11-20 DIAGNOSIS — M67.431 GANGLION CYST OF DORSUM OF RIGHT WRIST: ICD-10-CM

## 2020-11-20 PROCEDURE — 73221 MRI JOINT UPR EXTREM W/O DYE: CPT

## 2020-11-20 PROCEDURE — 73218 MRI UPPER EXTREMITY W/O DYE: CPT

## 2020-11-20 PROCEDURE — G1004 CDSM NDSC: HCPCS

## 2020-12-02 ENCOUNTER — TELEPHONE (OUTPATIENT)
Dept: OBGYN CLINIC | Facility: HOSPITAL | Age: 60
End: 2020-12-02

## 2021-02-27 DIAGNOSIS — I10 ESSENTIAL HYPERTENSION: ICD-10-CM

## 2021-02-27 DIAGNOSIS — R60.9 EDEMA, UNSPECIFIED TYPE: ICD-10-CM

## 2021-02-28 RX ORDER — HYDROCHLOROTHIAZIDE 12.5 MG/1
TABLET ORAL
Qty: 26 TABLET | Refills: 0 | Status: SHIPPED | OUTPATIENT
Start: 2021-02-28 | End: 2021-06-15

## 2021-02-28 RX ORDER — LISINOPRIL 10 MG/1
TABLET ORAL
Qty: 90 TABLET | Refills: 0 | Status: SHIPPED | OUTPATIENT
Start: 2021-02-28 | End: 2021-06-23 | Stop reason: SDUPTHER

## 2021-03-28 DIAGNOSIS — I10 ESSENTIAL HYPERTENSION: ICD-10-CM

## 2021-03-28 RX ORDER — LORATADINE 10 MG
TABLET ORAL
Qty: 30 TABLET | Refills: 5 | Status: SHIPPED | OUTPATIENT
Start: 2021-03-28 | End: 2021-09-24

## 2021-04-07 NOTE — PROGRESS NOTES
[facility] [address] [place] [address]    ASSESSMENT/PLAN: Sedrick Pettit is a 57 y o  No obstetric history on file  who presents for annual gynecologic exam     Encounter for routine gynecologic examination  - Routine well woman exam completed today  - Cervical Cancer Screening: Current ASCCP Guidelines reviewed  Last Pap: 04/08/2021   Pap smear was done today as part of visit  - HPV Vaccination status: Not immunized  - STI screening offered including HIV: not indicated based on history  - Breast Cancer Screening: Last Mammogram 10/29/2020, script provided today  - Colorectal cancer screening was not ordered  - The following were reviewed in today's visit: breast self exam, mammography screening ordered, adequate intake of calcium and vitamin D and exercise  - RTO 1 yr    Additional problems addressed at this visit:  1  Routine gynecological examination  -     Mammo screening bilateral w 3d & cad; Future; Expected date: 04/08/2021  -     Liquid-based pap, screening    2  Screening for malignant neoplasm of cervix  -     Liquid-based pap, screening    3  Encounter for screening mammogram for breast cancer  -     Mammo screening bilateral w 3d & cad; Future; Expected date: 04/08/2021    4  Bilateral deafness  - daughter is here to sign for visit      CC:  Annual Gynecologic Examination    HPI: Sedrick Pettit is a 57 y o  No obstetric history on file  who presents for annual gynecologic examination  Valeria Blancas's daughter is here to sign for visit  Denies concerns  No vaginal bleeding since menopause  Last pap smear was 2019, mammogram 10/2020- script was provided, and colonoscopy 8/2015  She has had difficulty sleeping and notes frequent nights of interrupted sleep, trys to stay active and walks dog daily, 2 servings of coffee daily  Performs regular SBE         The following portions of the patient's history were reviewed and updated as appropriate: allergies, current medications, past family history, past medical history, obstetric history, gynecologic history, past social history, past surgical history and problem list     Review of Systems   Constitutional: Negative for activity change, appetite change, fatigue and fever  Insomnia   Respiratory: Negative for cough and shortness of breath  Cardiovascular: Negative for chest pain, palpitations and leg swelling  Gastrointestinal: Negative for constipation and diarrhea  Endocrine: Negative for cold intolerance and heat intolerance  Genitourinary: Negative for difficulty urinating, dysuria, frequency, menstrual problem, pelvic pain, vaginal bleeding, vaginal discharge and vaginal pain  Neurological: Negative for light-headedness and headaches  Psychiatric/Behavioral: The patient is not nervous/anxious  Objective:  /76 (BP Location: Right arm, Patient Position: Sitting, Cuff Size: Large)   Ht 4' 11" (1 499 m)   Wt 86 8 kg (191 lb 4 8 oz)   LMP  (LMP Unknown)   BMI 38 64 kg/m²    Physical Exam  Vitals signs reviewed  Constitutional:       Appearance: Normal appearance  HENT:      Head: Normocephalic  Neck:      Musculoskeletal: Normal range of motion  Thyroid: No thyroid mass or thyroid tenderness  Cardiovascular:      Rate and Rhythm: Normal rate and regular rhythm  Heart sounds: Normal heart sounds  Pulmonary:      Effort: Pulmonary effort is normal       Breath sounds: Normal breath sounds  Chest:      Breasts:         Right: No mass, nipple discharge, skin change or tenderness  Left: No mass, nipple discharge, skin change or tenderness  Abdominal:      General: There is no distension  Palpations: There is no mass  Tenderness: There is no abdominal tenderness  There is no guarding  Genitourinary:     General: Normal vulva  Exam position: Lithotomy position  Labia:         Right: No tenderness or lesion           Left: No tenderness or lesion  Vagina: No vaginal discharge, tenderness, bleeding or lesions  Cervix: No discharge, lesion, erythema or cervical bleeding  Uterus: Normal  Not enlarged and not tender  Adnexa:         Right: No mass, tenderness or fullness  Left: No mass, tenderness or fullness  Lymphadenopathy:      Upper Body:      Right upper body: No axillary adenopathy  Left upper body: No axillary adenopathy  Skin:     General: Skin is warm and dry  Neurological:      Mental Status: She is alert     Psychiatric:         Mood and Affect: Mood normal          Behavior: Behavior normal          Judgment: Judgment normal              Gary Smallwood CNM  [facility]  04/08/21 8:19 PM

## 2021-04-08 ENCOUNTER — OFFICE VISIT (OUTPATIENT)
Dept: OBGYN CLINIC | Facility: MEDICAL CENTER | Age: 61
End: 2021-04-08
Payer: COMMERCIAL

## 2021-04-08 VITALS
WEIGHT: 191.3 LBS | BODY MASS INDEX: 38.56 KG/M2 | SYSTOLIC BLOOD PRESSURE: 112 MMHG | HEIGHT: 59 IN | DIASTOLIC BLOOD PRESSURE: 76 MMHG

## 2021-04-08 DIAGNOSIS — Z12.31 ENCOUNTER FOR SCREENING MAMMOGRAM FOR BREAST CANCER: ICD-10-CM

## 2021-04-08 DIAGNOSIS — H91.93 BILATERAL DEAFNESS: ICD-10-CM

## 2021-04-08 DIAGNOSIS — Z01.419 ROUTINE GYNECOLOGICAL EXAMINATION: Primary | ICD-10-CM

## 2021-04-08 DIAGNOSIS — Z12.4 SCREENING FOR MALIGNANT NEOPLASM OF CERVIX: ICD-10-CM

## 2021-04-08 PROBLEM — H25.13 AGE-RELATED NUCLEAR CATARACT OF BOTH EYES: Status: ACTIVE | Noted: 2019-11-22

## 2021-04-08 PROBLEM — IMO0002 BORDERLINE OPEN-ANGLE GLAUCOMA: Status: ACTIVE | Noted: 2019-11-22

## 2021-04-08 PROBLEM — H18.509 HEREDITARY CORNEAL DYSTROPHY: Status: ACTIVE | Noted: 2019-11-22

## 2021-04-08 PROCEDURE — 87624 HPV HI-RISK TYP POOLED RSLT: CPT | Performed by: ADVANCED PRACTICE MIDWIFE

## 2021-04-08 PROCEDURE — G0145 SCR C/V CYTO,THINLAYER,RESCR: HCPCS | Performed by: ADVANCED PRACTICE MIDWIFE

## 2021-04-08 PROCEDURE — S0610 ANNUAL GYNECOLOGICAL EXAMINA: HCPCS | Performed by: ADVANCED PRACTICE MIDWIFE

## 2021-04-13 LAB
HPV HR 12 DNA CVX QL NAA+PROBE: NEGATIVE
HPV16 DNA CVX QL NAA+PROBE: NEGATIVE
HPV18 DNA CVX QL NAA+PROBE: NEGATIVE

## 2021-04-14 LAB
LAB AP GYN PRIMARY INTERPRETATION: NORMAL
Lab: NORMAL

## 2021-04-30 ENCOUNTER — TELEPHONE (OUTPATIENT)
Dept: FAMILY MEDICINE CLINIC | Facility: CLINIC | Age: 61
End: 2021-04-30

## 2021-04-30 NOTE — TELEPHONE ENCOUNTER
NYA WALKED IN OFFICE ASKING FOR REFILL ON [facility] NON-ASPIRIN 5OO MG CAPLET//TAKE 2 TABLET 4 TIMES DAILY AS NEEDED//[facility] [address] [facility] [address]  SHE HAS BACK MUSCLE SPASM   "SHE CANNOT USE TYLENOL  SHE HAS ASPIRIN 8  SHE IS ON BLOOD THINNER "  THIS IS WHAT  WROTE ON A SLIP OF PAPER  HE ALSO HANDED ME THE BOTTLE OF HER NON ASPIRING CAPLETS FOR REFILL AS INDICATED ABOVE  THANK YOU

## 2021-04-30 NOTE — TELEPHONE ENCOUNTER
Spoke to Nilesh Vitale who stated patient was requesting Aspirin 81 mg and it is ready for     Pharmacist also stated they have a  [facility] non-aspirin 500 mg, but stated they do not have it on he med list

## 2021-04-30 NOTE — TELEPHONE ENCOUNTER
NYA WALKED IN OFFICE ASKING FOR REFILL ON [facility] NON-ASPIRIN 5OO MG CAPLET//TAKE 2 TABLET 4 TIMES DAILY AS NEEDED//[facility] [address]  SHE HAS BACK MUSCLE SPASM   "SHE CANNOT USE TYLENOL  SHE HAS ASPIRIN 8  SHE IS ON BLOOD THINNER "  THIS IS WHAT  WROTE ON A SLIP OF PAPER  HE ALSO HANDED ME THE BOTTLE OF HER NON ASPIRING CAPLETS FOR REFILL AS INDICATED ABOVE  THANK YOU

## 2021-05-03 ENCOUNTER — TELEPHONE (OUTPATIENT)
Dept: FAMILY MEDICINE CLINIC | Facility: CLINIC | Age: 61
End: 2021-05-03

## 2021-05-03 NOTE — TELEPHONE ENCOUNTER
Recvd called from dr Cecil Chang that pt cancel appt for colonoscopy due to being sick from covid vaccine    They wanted us to let dr Eduardo Santacruz know

## 2021-05-23 DIAGNOSIS — I10 ESSENTIAL HYPERTENSION: ICD-10-CM

## 2021-05-23 DIAGNOSIS — R60.9 EDEMA, UNSPECIFIED TYPE: ICD-10-CM

## 2021-05-23 RX ORDER — HYDROCHLOROTHIAZIDE 12.5 MG/1
TABLET ORAL
Qty: 26 TABLET | Refills: 0 | OUTPATIENT
Start: 2021-05-23

## 2021-05-23 RX ORDER — LISINOPRIL 10 MG/1
TABLET ORAL
Qty: 90 TABLET | Refills: 0 | OUTPATIENT
Start: 2021-05-23

## 2021-05-26 NOTE — TELEPHONE ENCOUNTER
LMOM with a  voice mail that patient only has a 30 day supply of Lisinopril and HCTZ and that per Dr Luc Goodwin, patient needs to make an appt  For more refills

## 2021-06-15 DIAGNOSIS — R60.9 EDEMA, UNSPECIFIED TYPE: ICD-10-CM

## 2021-06-15 RX ORDER — HYDROCHLOROTHIAZIDE 12.5 MG/1
TABLET ORAL
Qty: 26 TABLET | Refills: 0 | Status: SHIPPED | OUTPATIENT
Start: 2021-06-15 | End: 2021-09-24

## 2021-06-23 DIAGNOSIS — I10 ESSENTIAL HYPERTENSION: ICD-10-CM

## 2021-06-23 RX ORDER — LISINOPRIL 10 MG/1
10 TABLET ORAL DAILY
Qty: 30 TABLET | Refills: 0 | Status: SHIPPED | OUTPATIENT
Start: 2021-06-23 | End: 2021-07-16

## 2021-07-19 ENCOUNTER — TELEPHONE (OUTPATIENT)
Dept: FAMILY MEDICINE CLINIC | Facility: CLINIC | Age: 61
End: 2021-07-19

## 2021-07-19 DIAGNOSIS — E78.2 MIXED HYPERLIPIDEMIA: Primary | ICD-10-CM

## 2021-07-19 RX ORDER — PRAVASTATIN SODIUM 20 MG
20 TABLET ORAL
Qty: 30 TABLET | Refills: 0 | Status: SHIPPED | OUTPATIENT
Start: 2021-07-19 | End: 2021-08-18

## 2021-07-19 NOTE — TELEPHONE ENCOUNTER
Fax received for a refill on Pravastatin 20 mg taking 1 tablet daily but this medication is not on the Med list  Please advise if this is appropriate to fill  If so please [facility]  If not please let us know and we will reach out to pt

## 2021-08-04 ENCOUNTER — DOCUMENTATION (OUTPATIENT)
Dept: OBGYN CLINIC | Facility: MEDICAL CENTER | Age: 61
End: 2021-08-04

## 2021-08-18 DIAGNOSIS — E78.2 MIXED HYPERLIPIDEMIA: ICD-10-CM

## 2021-08-18 RX ORDER — PRAVASTATIN SODIUM 20 MG
TABLET ORAL
Qty: 30 TABLET | Refills: 0 | Status: SHIPPED | OUTPATIENT
Start: 2021-08-18 | End: 2021-09-13

## 2021-10-25 DIAGNOSIS — Z29.8 NEED FOR SBE (SUBACUTE BACTERIAL ENDOCARDITIS) PROPHYLAXIS: Primary | ICD-10-CM

## 2021-10-25 DIAGNOSIS — R60.9 EDEMA, UNSPECIFIED TYPE: ICD-10-CM

## 2021-10-25 DIAGNOSIS — I10 ESSENTIAL HYPERTENSION: ICD-10-CM

## 2021-10-25 RX ORDER — CLINDAMYCIN HYDROCHLORIDE 300 MG/1
CAPSULE ORAL
Qty: 2 CAPSULE | Refills: 0 | Status: SHIPPED | OUTPATIENT
Start: 2021-10-25 | End: 2021-11-01

## 2021-10-25 RX ORDER — HYDROCHLOROTHIAZIDE 12.5 MG/1
12.5 TABLET ORAL 2 TIMES WEEKLY
Qty: 14 TABLET | Refills: 0 | Status: SHIPPED | OUTPATIENT
Start: 2021-10-25 | End: 2021-11-20 | Stop reason: SDUPTHER

## 2021-10-25 RX ORDER — LISINOPRIL 10 MG/1
10 TABLET ORAL DAILY
Qty: 7 TABLET | Refills: 0 | Status: SHIPPED | OUTPATIENT
Start: 2021-10-25 | End: 2021-11-20 | Stop reason: SDUPTHER

## 2021-10-29 ENCOUNTER — HOSPITAL ENCOUNTER (OUTPATIENT)
Dept: MAMMOGRAPHY | Facility: MEDICAL CENTER | Age: 61
Discharge: HOME | End: 2021-10-29
Payer: COMMERCIAL

## 2021-10-29 VITALS — BODY MASS INDEX: 38.51 KG/M2 | WEIGHT: 191 LBS | HEIGHT: 59 IN

## 2021-10-29 DIAGNOSIS — Z12.31 ENCOUNTER FOR SCREENING MAMMOGRAM FOR BREAST CANCER: ICD-10-CM

## 2021-10-29 DIAGNOSIS — Z01.419 ROUTINE GYNECOLOGICAL EXAMINATION: ICD-10-CM

## 2021-10-29 PROCEDURE — 77063 BREAST TOMOSYNTHESIS BI: CPT

## 2021-10-29 PROCEDURE — 77067 SCR MAMMO BI INCL CAD: CPT

## 2021-11-20 DIAGNOSIS — I10 ESSENTIAL HYPERTENSION: ICD-10-CM

## 2021-11-20 DIAGNOSIS — I10 ESSENTIAL HYPERTENSION: Primary | ICD-10-CM

## 2021-11-20 DIAGNOSIS — R60.9 EDEMA, UNSPECIFIED TYPE: ICD-10-CM

## 2021-11-20 DIAGNOSIS — E78.2 MIXED HYPERLIPIDEMIA: ICD-10-CM

## 2021-11-20 RX ORDER — LISINOPRIL 10 MG/1
10 TABLET ORAL DAILY
Qty: 5 TABLET | Refills: 0 | Status: SHIPPED | OUTPATIENT
Start: 2021-11-20 | End: 2021-12-29 | Stop reason: SDUPTHER

## 2021-11-20 RX ORDER — HYDROCHLOROTHIAZIDE 12.5 MG/1
12.5 TABLET ORAL 2 TIMES WEEKLY
Qty: 3 TABLET | Refills: 0 | Status: SHIPPED | OUTPATIENT
Start: 2021-11-22 | End: 2021-12-29 | Stop reason: SDUPTHER

## 2021-12-29 DIAGNOSIS — R60.9 EDEMA, UNSPECIFIED TYPE: ICD-10-CM

## 2021-12-29 DIAGNOSIS — I10 ESSENTIAL HYPERTENSION: ICD-10-CM

## 2021-12-29 DIAGNOSIS — J45.20 MILD INTERMITTENT ASTHMA WITHOUT COMPLICATION: ICD-10-CM

## 2021-12-29 DIAGNOSIS — E78.2 MIXED HYPERLIPIDEMIA: ICD-10-CM

## 2021-12-29 DIAGNOSIS — I10 ESSENTIAL HYPERTENSION: Primary | ICD-10-CM

## 2021-12-29 DIAGNOSIS — E03.9 HYPOTHYROIDISM, UNSPECIFIED TYPE: ICD-10-CM

## 2021-12-29 RX ORDER — LISINOPRIL 10 MG/1
10 TABLET ORAL DAILY
Qty: 14 TABLET | Refills: 0 | Status: SHIPPED | OUTPATIENT
Start: 2021-12-29 | End: 2022-02-21

## 2021-12-29 RX ORDER — HYDROCHLOROTHIAZIDE 12.5 MG/1
12.5 TABLET ORAL 2 TIMES WEEKLY
Qty: 10 TABLET | Refills: 0 | Status: SHIPPED | OUTPATIENT
Start: 2021-12-30 | End: 2022-02-21 | Stop reason: DRUGHIGH

## 2021-12-29 RX ORDER — PRAVASTATIN SODIUM 20 MG
20 TABLET ORAL
Qty: 14 TABLET | Refills: 0 | Status: SHIPPED | OUTPATIENT
Start: 2021-12-29 | End: 2022-02-21 | Stop reason: SDUPTHER

## 2022-01-07 DIAGNOSIS — I10 ESSENTIAL HYPERTENSION: ICD-10-CM

## 2022-01-11 RX ORDER — ASPIRIN 81 MG/1
TABLET, CHEWABLE ORAL
Qty: 30 TABLET | Refills: 2 | Status: SHIPPED | OUTPATIENT
Start: 2022-01-11 | End: 2022-04-05 | Stop reason: SDUPTHER

## 2022-02-21 ENCOUNTER — OFFICE VISIT (OUTPATIENT)
Dept: FAMILY MEDICINE CLINIC | Facility: CLINIC | Age: 62
End: 2022-02-21
Payer: COMMERCIAL

## 2022-02-21 VITALS
SYSTOLIC BLOOD PRESSURE: 122 MMHG | BODY MASS INDEX: 39.72 KG/M2 | TEMPERATURE: 97.8 F | DIASTOLIC BLOOD PRESSURE: 70 MMHG | HEIGHT: 59 IN | HEART RATE: 100 BPM | WEIGHT: 197 LBS

## 2022-02-21 DIAGNOSIS — E03.9 HYPOTHYROIDISM, UNSPECIFIED TYPE: ICD-10-CM

## 2022-02-21 DIAGNOSIS — Z91.09 ENVIRONMENTAL ALLERGIES: ICD-10-CM

## 2022-02-21 DIAGNOSIS — R60.9 EDEMA, UNSPECIFIED TYPE: ICD-10-CM

## 2022-02-21 DIAGNOSIS — Z86.73 HISTORY OF TRANSIENT ISCHEMIC ATTACK (TIA): ICD-10-CM

## 2022-02-21 DIAGNOSIS — E78.2 MIXED HYPERLIPIDEMIA: ICD-10-CM

## 2022-02-21 DIAGNOSIS — Z23 NEED FOR INFLUENZA VACCINATION: Primary | ICD-10-CM

## 2022-02-21 DIAGNOSIS — Z23 NEED FOR DIPHTHERIA-TETANUS-PERTUSSIS (TDAP) VACCINE: ICD-10-CM

## 2022-02-21 PROCEDURE — 90682 RIV4 VACC RECOMBINANT DNA IM: CPT

## 2022-02-21 PROCEDURE — 3008F BODY MASS INDEX DOCD: CPT

## 2022-02-21 PROCEDURE — 90471 IMMUNIZATION ADMIN: CPT

## 2022-02-21 PROCEDURE — 90715 TDAP VACCINE 7 YRS/> IM: CPT

## 2022-02-21 PROCEDURE — 3725F SCREEN DEPRESSION PERFORMED: CPT

## 2022-02-21 PROCEDURE — 99214 OFFICE O/P EST MOD 30 MIN: CPT

## 2022-02-21 PROCEDURE — 90472 IMMUNIZATION ADMIN EACH ADD: CPT

## 2022-02-21 PROCEDURE — 1036F TOBACCO NON-USER: CPT

## 2022-02-21 RX ORDER — LISINOPRIL 10 MG/1
10 TABLET ORAL DAILY
Qty: 14 TABLET | Refills: 0 | Status: CANCELLED | OUTPATIENT
Start: 2022-02-21

## 2022-02-21 RX ORDER — HYDROCHLOROTHIAZIDE 12.5 MG/1
12.5 TABLET ORAL 2 TIMES WEEKLY
Qty: 10 TABLET | Refills: 0 | Status: CANCELLED | OUTPATIENT
Start: 2022-02-21

## 2022-02-21 RX ORDER — OLOPATADINE HYDROCHLORIDE 7 MG/ML
1 SOLUTION OPHTHALMIC 2 TIMES DAILY PRN
Qty: 5 ML | Refills: 2 | Status: SHIPPED | OUTPATIENT
Start: 2022-02-21

## 2022-02-21 RX ORDER — HYDROCHLOROTHIAZIDE 25 MG/1
25 TABLET ORAL DAILY
Qty: 90 TABLET | Refills: 1 | Status: SHIPPED | OUTPATIENT
Start: 2022-02-21 | End: 2022-04-05

## 2022-02-21 RX ORDER — PRAVASTATIN SODIUM 20 MG
20 TABLET ORAL
Qty: 14 TABLET | Refills: 0 | Status: SHIPPED | OUTPATIENT
Start: 2022-02-21 | End: 2022-02-24 | Stop reason: SDUPTHER

## 2022-02-21 NOTE — PROGRESS NOTES
Assessment and Plan:    Problem List Items Addressed This Visit        Endocrine    Hypothyroidism     Await lab            Other    Edema    Relevant Medications    hydrochlorothiazide (HYDRODIURIL) 25 mg tablet    Other Relevant Orders    Comprehensive metabolic panel    TSH, 3rd generation    CBC and differential    UA (URINE) with reflex to Scope    Mixed hyperlipidemia    Relevant Medications    pravastatin (PRAVACHOL) 20 mg tablet    Other Relevant Orders    Lipid panel      Other Visit Diagnoses     Need for influenza vaccination    -  Primary    Relevant Orders    influenza vaccine, quadrivalent, recombinant, PF, 0 5 mL, for patients 18 yr+ (FLUBLOK)    BMI 39 0-39 9,adult        Relevant Orders    Ambulatory referral to Nutrition Services    Need for diphtheria-tetanus-pertussis (Tdap) vaccine        Relevant Orders    TDAP VACCINE GREATER THAN OR EQUAL TO 6YO IM    History of transient ischemic attack (TIA)        Relevant Orders    VAS carotid complete study    Environmental allergies        Relevant Medications    Olopatadine HCl (Pazeo) 0 7 % SOLN                 Diagnoses and all orders for this visit:    Need for influenza vaccination  -     influenza vaccine, quadrivalent, recombinant, PF, 0 5 mL, for patients 18 yr+ (FLUBLOK)    Edema, unspecified type  -     hydrochlorothiazide (HYDRODIURIL) 25 mg tablet; Take 1 tablet (25 mg total) by mouth daily  -     Comprehensive metabolic panel; Future  -     TSH, 3rd generation; Future  -     CBC and differential; Future  -     UA (URINE) with reflex to Scope    Mixed hyperlipidemia  -     pravastatin (PRAVACHOL) 20 mg tablet; Take 1 tablet (20 mg total) by mouth daily at bedtime  -     Lipid panel; Future    Hypothyroidism, unspecified type    BMI 39 0-39 9,adult  -     Ambulatory referral to Nutrition Services;  Future    Need for diphtheria-tetanus-pertussis (Tdap) vaccine  -     TDAP VACCINE GREATER THAN OR EQUAL TO 6YO IM    History of transient ischemic attack (TIA)  -     VAS carotid complete study; Future    Environmental allergies  -     Olopatadine HCl (Pazeo) 0 7 % SOLN; Apply 1 drop to eye 2 (two) times a day as needed (allergic symptoms)              Subjective:      Patient ID: Marlene Bautista is a 60 y o  female  CC:    Chief Complaint   Patient presents with    Follow-up     review recent labs  Pt states she has not taken her medication , she was unable they were filled back in [place]   Hypertension    Leg Swelling     Pt states she has had leg swelling to left leg, states she has noticed it more recently due to it getting worse  HPI:    Here for f/u BP  Nl off meds, needs lab, retaining more fluid off diuretic,didn't have much improvement on 12 5 mg, no chest, occ skiptped beat      The following portions of the patient's history were reviewed and updated as appropriate: allergies, current medications, past family history, past medical history, past social history, past surgical history and problem list       Review of Systems   Constitutional: Negative for activity change, appetite change and fatigue  Respiratory: Negative for shortness of breath  Cardiovascular: Positive for leg swelling  Negative for chest pain and palpitations  Musculoskeletal: Negative for arthralgias  Neurological: Negative for dizziness and headaches  Psychiatric/Behavioral: Negative for sleep disturbance  The patient is not nervous/anxious  Data to review:       Objective:    Vitals:    02/21/22 1349   BP: 122/70   BP Location: Left arm   Patient Position: Sitting   Cuff Size: Adult   Pulse: 100   Temp: 97 8 °F (36 6 °C)   TempSrc: Probe   Weight: 89 4 kg (197 lb)   Height: 4' 11" (1 499 m)        Physical Exam  Vitals reviewed  Constitutional:       Appearance: Normal appearance  She is obese  Neck:      Vascular: No carotid bruit  Cardiovascular:      Rate and Rhythm: Normal rate and regular rhythm  Pulses: Normal pulses        Heart sounds: Normal heart sounds  Pulmonary:      Effort: Pulmonary effort is normal       Breath sounds: Normal breath sounds  Musculoskeletal:      Right lower leg: Edema present  Left lower leg: Edema present  Comments: 2 plus edema bilat   Lymphadenopathy:      Cervical: No cervical adenopathy  Neurological:      Mental Status: She is alert  BMI Counseling: Body mass index is 39 79 kg/m²  The BMI is above normal  Nutrition recommendations include reducing portion sizes, decreasing overall calorie intake, 3-5 servings of fruits/vegetables daily, reducing fast food intake and consuming healthier snacks  Exercise recommendations include exercising 3-5 times per week

## 2022-02-21 NOTE — PATIENT INSTRUCTIONS
Await lab  Obesity   AMBULATORY CARE:   Obesity  is when your body mass index (BMI) is greater than 30  Your healthcare provider will use your height and weight to measure your BMI  The risks of obesity include  many health problems, including injuries or physical disability  You may need tests to check for the following:  · Diabetes    · High blood pressure or high cholesterol    · Heart disease    · Stroke    · Gallbladder or liver disease    · Cancer of the colon, breast, prostate, liver, or kidney    · Sleep apnea    · Arthritis or gout    Seek care immediately if:   · You have a severe headache, confusion, or difficulty speaking  · You have weakness on one side of your body  · You have chest pain, sweating, or shortness of breath  Call your doctor if:   · You have symptoms of gallbladder or liver disease, such as pain in your upper abdomen  · You have knee or hip pain and discomfort while walking  · You have symptoms of diabetes, such as intense hunger and thirst, and frequent urination  · You have symptoms of sleep apnea, such as snoring or daytime sleepiness  · You have questions or concerns about your condition or care  Treatment for obesity  focuses on helping you lose weight to improve your health  Even a small decrease in BMI can reduce the risk for many health problems  Your healthcare provider will help you set a weight-loss goal   · Lifestyle changes  are the first step in treating obesity  These include making healthy food choices and getting regular physical activity  Your healthcare provider may suggest a weight-loss program that involves coaching, education, and therapy  · Medicine  may help you lose weight when it is used with a healthy foods and physical activity  · Surgery  can help you lose weight if you are very obese and have other health problems  There are several types of weight-loss surgery  Ask your healthcare provider for more information      Be successful losing weight:   · Set small, realistic goals  An example of a small goal is to walk for 20 minutes 5 days a week  Anther goal is to lose 5% of your body weight  · Tell friends, family members, and coworkers about your goals  and ask for their support  Ask a friend to lose weight with you, or join a weight-loss support group  · Identify foods or triggers that may cause you to overeat , and find ways to avoid them  Remove tempting high-calorie foods from your home and workplace  Place a bowl of fresh fruit on your kitchen counter  If stress causes you to eat, then find other ways to cope with stress  A counselor or therapist may be able to help you  · Keep a diary to track what you eat and drink  Also write down how many minutes of physical activity you do each day  Weigh yourself once a week and record it in your diary  Eating changes: You will need to eat 500 to 1,000 fewer calories each day than you currently eat to lose 1 to 2 pounds a week  The following changes will help you cut calories:  · Eat smaller portions  Use small plates, no larger than 9 inches in diameter  Fill your plate half full of fruits and vegetables  Measure your food using measuring cups until you know what a serving size looks like  · Eat 3 meals and 1 or 2 snacks each day  Plan your meals in advance  [employer] and eat at home most of the time  Eat slowly  Do not skip meals  Skipping meals can lead to overeating later in the day  This can make it harder for you to lose weight  Talk with a dietitian to help you make a meal plan and schedule that is right for you  · Eat fruits and vegetables at every meal   They are low in calories and high in fiber, which makes you feel full  Do not add butter, margarine, or cream sauce to vegetables  Use herbs to season steamed vegetables  · Eat less fat and fewer fried foods  Eat more baked or grilled chicken and fish   These protein sources are lower in calories and fat than red meat  Limit fast food  Dress your salads with olive oil and vinegar instead of bottled dressing  · Limit the amount of sugar you eat  Do not drink sugary beverages  Limit alcohol  Activity changes:  Physical activity is good for your body in many ways  It helps you burn calories and build strong muscles  It decreases stress and depression, and improves your mood  It can also help you sleep better  Talk to your healthcare provider before you begin an exercise program   · Exercise for at least 30 minutes 5 days a week  Start slowly  Set aside time each day for physical activity that you enjoy and that is convenient for you  It is best to do both weight training and an activity that increases your heart rate, such as walking, bicycling, or swimming  · Find ways to be more active  Do yard work and housecleaning  Walk up the stairs instead of using elevators  Spend your leisure time going to events that require walking, such as outdoor festivals or fairs  This extra physical activity can help you lose weight and keep it off  Follow up with your doctor as directed: You may need to meet with a dietitian  Write down your questions so you remember to ask them during your visits  © Copyright [employer] 2021 Information is for End User's use only and may not be sold, redistributed or otherwise used for commercial purposes  All illustrations and images included in CareNotes® are the copyrighted property of [employer]  or [facility] Maria Eugenia Hinton   The above information is an  only  It is not intended as medical advice for individual conditions or treatments  Talk to your doctor, nurse or pharmacist before following any medical regimen to see if it is safe and effective for you      Weight Management   AMBULATORY CARE:   Why it is important to manage your weight:  Being overweight increases your risk of health conditions such as heart disease, high blood pressure, type 2 diabetes, and certain types of cancer  It can also increase your risk for osteoarthritis, sleep apnea, and other respiratory problems  Aim for a slow, steady weight loss  Even a small amount of weight loss can lower your risk of health problems  How to lose weight safely:  A safe and healthy way to lose weight is to eat fewer calories and get regular exercise  · You can lose up about 1 pound a week by decreasing the number of calories you eat by 500 calories each day  You can decrease calories by eating smaller portion sizes or by cutting out high-calorie foods  Read labels to find out how many calories are in the foods you eat  · You can also burn calories with exercise such as walking, swimming, or biking  You will be more likely to keep weight off if you make these changes part of your lifestyle  Exercise at least 30 minutes per day on most days of the week  You can also fit in more physical activity by taking the stairs instead of the elevator or parking farther away from stores  Ask your healthcare provider about the best exercise plan for you  Healthy meal plan for weight management:  A healthy meal plan includes a variety of foods, contains fewer calories, and helps you stay healthy  A healthy meal plan includes the following:     · Eat whole-grain foods more often  A healthy meal plan should contain fiber  Fiber is the part of grains, fruits, and vegetables that is not broken down by your body  Whole-grain foods are healthy and provide extra fiber in your diet  Some examples of whole-grain foods are whole-wheat breads and pastas, oatmeal, brown rice, and bulgur  · Eat a variety of vegetables every day  Include dark, leafy greens such as spinach, kale, lesley greens, and mustard greens  Eat yellow and orange vegetables such as carrots, sweet potatoes, and winter squash  · Eat a variety of fruits every day  Choose fresh or canned fruit (canned in its own juice or light syrup) instead of juice  Fruit juice has very little or no fiber  · Eat low-fat dairy foods  Drink fat-free (skim) milk or 1% milk  Eat fat-free yogurt and low-fat cottage cheese  Try low-fat cheeses such as mozzarella and other reduced-fat cheeses  · Choose meat and other protein foods that are low in fat  Choose beans or other legumes such as split peas or lentils  Choose fish, skinless poultry (chicken or turkey), or lean cuts of red meat (beef or pork)  Before you cook meat or poultry, cut off any visible fat  · Use less fat and oil  Try baking foods instead of frying them  Add less fat, such as margarine, sour cream, regular salad dressing and mayonnaise to foods  Eat fewer high-fat foods  Some examples of high-fat foods include french fries, doughnuts, ice cream, and cakes  · Eat fewer sweets  Limit foods and drinks that are high in sugar  This includes candy, cookies, regular soda, and sweetened drinks  Ways to decrease calories:   · Eat smaller portions  ? Use a small plate with smaller servings  ? Do not eat second helpings  ? When you eat at a restaurant, ask for a box and place half of your meal in the box before you eat  ? Share an entrée with someone else  · Replace high-calorie snacks with healthy, low-calorie snacks  ? Choose fresh fruit, vegetables, fat-free rice cakes, or air-popped popcorn instead of potato chips, nuts, or chocolate  ? Choose water or calorie-free drinks instead of soda or sweetened drinks  · Do not shop for groceries when you are hungry  You may be more likely to make unhealthy food choices  Take a grocery list of healthy foods and shop after you have eaten  · Eat regular meals  Do not skip meals  Skipping meals can lead to overeating later in the day  This can make it harder for you to lose weight   Eat a healthy snack in place of a meal if you do not have time to eat a regular meal  Talk with a dietitian to help you create a meal plan and schedule that is right for you  Other things to consider as you try to lose weight:   · Be aware of situations that may give you the urge to overeat, such as eating while watching television  Find ways to avoid these situations  For example, read a book, go for a walk, or do crafts  · Meet with a weight loss support group or friends who are also trying to lose weight  This may help you stay motivated to continue working on your weight loss goals  © Copyright [employer] 2021 Information is for End User's use only and may not be sold, redistributed or otherwise used for commercial purposes  All illustrations and images included in CareNotes® are the copyrighted property of A D A M , Inc  or [facility] Maria Eugenia Hinton   The above information is an  only  It is not intended as medical advice for individual conditions or treatments  Talk to your doctor, nurse or pharmacist before following any medical regimen to see if it is safe and effective for you

## 2022-02-21 NOTE — PROGRESS NOTES
BMI Counseling: Body mass index is 39 79 kg/m²  The BMI is above normal  Nutrition recommendations include reducing portion sizes, decreasing overall calorie intake, 3-5 servings of fruits/vegetables daily, reducing fast food intake and consuming healthier snacks  Exercise recommendations include exercising 3-5 times per week

## 2022-02-24 ENCOUNTER — TELEPHONE (OUTPATIENT)
Dept: FAMILY MEDICINE CLINIC | Facility: CLINIC | Age: 62
End: 2022-02-24

## 2022-02-24 DIAGNOSIS — E78.2 MIXED HYPERLIPIDEMIA: ICD-10-CM

## 2022-02-24 RX ORDER — PRAVASTATIN SODIUM 20 MG
20 TABLET ORAL
Qty: 90 TABLET | Refills: 1 | Status: SHIPPED | OUTPATIENT
Start: 2022-02-24 | End: 2022-04-05 | Stop reason: SDUPTHER

## 2022-02-24 NOTE — TELEPHONE ENCOUNTER
Pt's daughter is calling regarding Pravastatin  She is asking if this can please be called into the pharmacy again? Pt was able to  her other two medications but they did not have the order for this one  Please call Edna Cruz back at [phone] when it is called in so she can relay the message to her mom   Thank you!!

## 2022-02-25 ENCOUNTER — HOSPITAL ENCOUNTER (OUTPATIENT)
Dept: NON INVASIVE DIAGNOSTICS | Facility: HOSPITAL | Age: 62
Discharge: HOME | End: 2022-02-25
Payer: COMMERCIAL

## 2022-02-25 DIAGNOSIS — Z86.73 HISTORY OF TRANSIENT ISCHEMIC ATTACK (TIA): ICD-10-CM

## 2022-02-25 PROCEDURE — 93880 EXTRACRANIAL BILAT STUDY: CPT | Performed by: SURGERY

## 2022-02-25 PROCEDURE — 93880 EXTRACRANIAL BILAT STUDY: CPT

## 2022-04-05 ENCOUNTER — OFFICE VISIT (OUTPATIENT)
Dept: FAMILY MEDICINE CLINIC | Facility: CLINIC | Age: 62
End: 2022-04-05
Payer: COMMERCIAL

## 2022-04-05 VITALS
BODY MASS INDEX: 39.31 KG/M2 | WEIGHT: 195 LBS | SYSTOLIC BLOOD PRESSURE: 122 MMHG | HEART RATE: 82 BPM | DIASTOLIC BLOOD PRESSURE: 70 MMHG | HEIGHT: 59 IN | TEMPERATURE: 98.1 F

## 2022-04-05 DIAGNOSIS — I10 ESSENTIAL HYPERTENSION: ICD-10-CM

## 2022-04-05 DIAGNOSIS — E78.2 MIXED HYPERLIPIDEMIA: ICD-10-CM

## 2022-04-05 DIAGNOSIS — R60.9 EDEMA, UNSPECIFIED TYPE: ICD-10-CM

## 2022-04-05 PROCEDURE — 1036F TOBACCO NON-USER: CPT | Performed by: FAMILY MEDICINE

## 2022-04-05 PROCEDURE — 3008F BODY MASS INDEX DOCD: CPT | Performed by: FAMILY MEDICINE

## 2022-04-05 PROCEDURE — 99213 OFFICE O/P EST LOW 20 MIN: CPT | Performed by: FAMILY MEDICINE

## 2022-04-05 RX ORDER — TRIAMTERENE AND HYDROCHLOROTHIAZIDE 37.5; 25 MG/1; MG/1
1 CAPSULE ORAL EVERY MORNING
Qty: 90 CAPSULE | Refills: 1 | Status: SHIPPED | OUTPATIENT
Start: 2022-04-05 | End: 2022-07-12

## 2022-04-05 RX ORDER — PRAVASTATIN SODIUM 20 MG
20 TABLET ORAL
Qty: 90 TABLET | Refills: 1 | Status: SHIPPED | OUTPATIENT
Start: 2022-04-05

## 2022-04-05 RX ORDER — ASPIRIN 81 MG/1
81 TABLET, CHEWABLE ORAL DAILY
Qty: 90 TABLET | Refills: 1 | Status: SHIPPED | OUTPATIENT
Start: 2022-04-05 | End: 2022-07-12 | Stop reason: SDUPTHER

## 2022-04-05 RX ORDER — HYDROCHLOROTHIAZIDE 25 MG/1
25 TABLET ORAL DAILY
Qty: 90 TABLET | Refills: 1 | Status: CANCELLED | OUTPATIENT
Start: 2022-04-05

## 2022-04-05 NOTE — PROGRESS NOTES
Assessment and Plan:    Problem List Items Addressed This Visit        Other    Edema     Check venous doppler, rx for compression  stockings         Relevant Medications    triamterene-hydrochlorothiazide (Dyazide) 37 5-25 mg per capsule    Other Relevant Orders    Compression Stocking    VAS lower limb venous duplex study, complete bilateral    Mixed hyperlipidemia    Relevant Medications    pravastatin (PRAVACHOL) 20 mg tablet      Other Visit Diagnoses     Essential hypertension        Relevant Medications    aspirin 81 mg chewable tablet    triamterene-hydrochlorothiazide (Dyazide) 37 5-25 mg per capsule                 Diagnoses and all orders for this visit:    Mixed hyperlipidemia  -     pravastatin (PRAVACHOL) 20 mg tablet; Take 1 tablet (20 mg total) by mouth daily at bedtime    Essential hypertension  -     aspirin 81 mg chewable tablet; Chew 1 tablet (81 mg total) daily Chew    Edema, unspecified type  -     Compression Stocking  -     VAS lower limb venous duplex study, complete bilateral; Future  -     triamterene-hydrochlorothiazide (Dyazide) 37 5-25 mg per capsule; Take 1 capsule by mouth every morning              Subjective:      Patient ID: Loi Navarrete is a 60 y o  female  CC:    Chief Complaint   Patient presents with    Follow-up     6 week f/u - pt had recent labs     Foot pain     Pt is c/o bilateral foot pain to bottom of foot  Pt states pain does getting better during the day while walking, does happen when standing up from seat L>R  Daughter states she has not been able to find compression stockings       Medication Refill     on pending medications        HPI:    Still with retaining fluid, no real change with hctz, doesn't have compression stockings, bottom of feet  Hurt when first gets up      The following portions of the patient's history were reviewed and updated as appropriate: allergies, current medications, past family history, past medical history, past social history, past surgical history and problem list       Review of Systems   Constitutional: Positive for unexpected weight change  Negative for activity change and appetite change  2 lb loss   Respiratory: Negative for shortness of breath  Cardiovascular: Positive for leg swelling  Negative for chest pain  Musculoskeletal: Positive for arthralgias  Foot pain   Neurological: Negative for numbness  Data to review:       Objective:    Vitals:    04/05/22 1452   BP: 122/70   BP Location: Right arm   Patient Position: Sitting   Cuff Size: Adult   Pulse: 82   Temp: 98 1 °F (36 7 °C)   TempSrc: Probe   Weight: 88 5 kg (195 lb)   Height: 4' 11" (1 499 m)        Physical Exam  Vitals reviewed  Constitutional:       Appearance: Normal appearance  She is obese  Cardiovascular:      Rate and Rhythm: Normal rate and regular rhythm  Pulses: Normal pulses  Heart sounds: Normal heart sounds  Pulmonary:      Effort: Pulmonary effort is normal       Breath sounds: Normal breath sounds  Musculoskeletal:      Right lower leg: Edema present  Left lower leg: Edema present  Neurological:      Mental Status: She is alert     Psychiatric:         Mood and Affect: Mood normal

## 2022-04-05 NOTE — PATIENT INSTRUCTIONS
Await venous doppler, get compression stockings , ice feet as needed  Plantar Fasciitis Exercises   WHAT YOU NEED TO KNOW:   Plantar fasciitis exercises help stretch your plantar fascia, calf muscles, and Achilles tendon  They also help strengthen the muscles that support your heel and foot  Exercises and stretching can help prevent plantar fasciitis from getting worse or coming back  DISCHARGE INSTRUCTIONS:   Contact your healthcare provider if:   · Your pain and swelling increase  · You develop new knee, hip, or back pain  · You have questions or concerns about your condition or care  How to do plantar fasciitis exercises:  Ask your healthcare provider when to start these exercises and how often to do them  · Slant board stretch:  Stand on a slanted board with your toes higher than your heel  Press your heel into the board  Keep your knee slightly bent  Hold this position for 1 minute  Repeat 5 times  · Heel stretch:  Stand up straight with your hands on a wall  Place your injured leg slightly behind your other leg  Keep your heels flat on the floor, lean forward, and bend both knees  Hold for 30 seconds  · Calf stretch:  Stand up straight with your hands on a wall  Step forward so that your uninjured foot is in front of your injured foot  Keep your front leg bent and your back leg straight  Gently lean forward until you feel your calf stretch  Hold for 30 seconds and then relax  · Seated plantar fascia stretch:  Sit on a firm surface, such as the floor or a mat  Extend your legs out in front of you  Raise your injured foot a few inches off the ground  Keep your leg straight  Grab the toes of your injured foot and pull them toward you  With your other hand, feel your plantar fascia  You should feel it press outward  Hold for 30 seconds  If you cannot reach your toes, loop a towel or tie around your foot  Gently pull on the towel or tie and flex your toes toward you  · Heel raises:  Stand on the injured leg  Raise your other leg off the ground  Hold onto a railing or wall for balance  Slowly rise up on the toes of your injured leg  Hold for 5 seconds  Slowly lower your heel to the ground  · Toe curls:  Place a towel on the floor  Put your foot flat on the towel  Grab the towel with your toes by curling them around the towel  Lift the towel up with your toes  · Toe taps:  Sit down and place your foot flat on the floor  Keep your heel on the floor  Point all your toes up toward the ceiling  While the 4 smaller toes are pointed up, bend your big toe down and tap it on the ground  Do 10 to 50 taps  Point all 5 toes up toward the ceiling again  This time keep your big toe pointed up and tap the 4 smaller toes on the ground  Do 10 to 50 taps each time  Follow up with your doctor as directed:  Write down your questions so you remember to ask them during your visits  © Copyright [address] 2022 Information is for End User's use only and may not be sold, redistributed or otherwise used for commercial purposes  All illustrations and images included in CareNotes® are the copyrighted property of A D A M , Inc  or [facility] Maria Eugenia Hinton   The above information is an  only  It is not intended as medical advice for individual conditions or treatments  Talk to your doctor, nurse or pharmacist before following any medical regimen to see if it is safe and effective for you

## 2022-04-13 ENCOUNTER — HOSPITAL ENCOUNTER (OUTPATIENT)
Dept: NON INVASIVE DIAGNOSTICS | Facility: HOSPITAL | Age: 62
Discharge: HOME | End: 2022-04-13
Payer: COMMERCIAL

## 2022-04-13 DIAGNOSIS — R60.9 EDEMA, UNSPECIFIED TYPE: ICD-10-CM

## 2022-04-13 PROCEDURE — 93970 EXTREMITY STUDY: CPT | Performed by: SURGERY

## 2022-04-13 PROCEDURE — 93970 EXTREMITY STUDY: CPT

## 2022-05-12 ENCOUNTER — ANNUAL EXAM (OUTPATIENT)
Dept: OBGYN CLINIC | Facility: MEDICAL CENTER | Age: 62
End: 2022-05-12
Payer: COMMERCIAL

## 2022-05-12 VITALS
SYSTOLIC BLOOD PRESSURE: 132 MMHG | WEIGHT: 193.5 LBS | DIASTOLIC BLOOD PRESSURE: 70 MMHG | BODY MASS INDEX: 39.01 KG/M2 | HEIGHT: 59 IN

## 2022-05-12 DIAGNOSIS — Z01.419 ROUTINE GYNECOLOGICAL EXAMINATION: Primary | ICD-10-CM

## 2022-05-12 DIAGNOSIS — Z12.31 ENCOUNTER FOR SCREENING MAMMOGRAM FOR BREAST CANCER: ICD-10-CM

## 2022-05-12 PROCEDURE — S0612 ANNUAL GYNECOLOGICAL EXAMINA: HCPCS | Performed by: ADVANCED PRACTICE MIDWIFE

## 2022-05-12 PROCEDURE — 3008F BODY MASS INDEX DOCD: CPT | Performed by: FAMILY MEDICINE

## 2022-05-12 NOTE — PROGRESS NOTES
[facility] [address] [place] [address]    ASSESSMENT/PLAN: Patrick Marsh is a 60 y o   who presents for annual gynecologic exam     Encounter for routine gynecologic examination  - Routine well woman exam completed today  - Cervical Cancer Screening: Current ASCCP Guidelines reviewed  Last Pap: 2021 normal  Next Pap Due: 1526-1029  - HPV Vaccination status: Not immunized  - STI screening offered including HIV: not indicated based on hx or requested at time of visit  - Breast Cancer Screening: Last Mammogram 10/29/2021, script given  - Colorectal cancer screening was not ordered  - The following were reviewed in today's visit: breast self exam, mammography screening ordered, adequate intake of calcium and vitamin D, exercise and age related changes  - RTO 1 yr    Additional problems addressed at this visit:  1  Routine gynecological examination  -     Mammo screening bilateral w 3d & cad; Future; Expected date: 2022    2  Encounter for screening mammogram for breast cancer  -     Mammo screening bilateral w 3d & cad; Future; Expected date: 2022          CC:  Annual Gynecologic Examination    HPI: Patrick Marsh is a 60 y o  Saira Brenner who presents for annual gynecologic examination  Braden Montgomery presents today for gyn exam  No vaginal bleeding since onset of menopause  2021 last pap smear- normal, Hx of abnormal pap smear no  Sexually active- no  10/2021 mammogram- normal , and 2021 colonoscopy- 5 yr repeat  Reports 5-6 hrs of sleep daily, 2 servings of calcium rich foods daily  Takes calcium supplement and vitamin D  Exercises- walks daily with dog  2 servings of caffeine daily  Performs SBE  Concerns:  No pelvic or vaginal pain  No breast pain          The following portions of the patient's history were reviewed and updated as appropriate: allergies, current medications, past family history, past medical history, obstetric history, gynecologic history, past social history, past surgical history and problem list     Review of Systems   Constitutional: Negative for activity change, appetite change, fatigue and fever  Respiratory: Negative for cough and shortness of breath  Cardiovascular: Negative for chest pain and leg swelling  Gastrointestinal: Positive for diarrhea  Negative for constipation  Genitourinary: Negative for difficulty urinating, dysuria, frequency, pelvic pain, vaginal bleeding, vaginal discharge and vaginal pain  Psychiatric/Behavioral: The patient is not nervous/anxious  Objective:  /70 (BP Location: Left arm, Patient Position: Sitting, Cuff Size: Large)   Ht 4' 11" (1 499 m)   Wt 87 8 kg (193 lb 8 oz)   LMP  (LMP Unknown)   BMI 39 08 kg/m²    Physical Exam  Vitals reviewed  Constitutional:       Appearance: Normal appearance  HENT:      Head: Normocephalic  Neck:      Thyroid: No thyroid mass or thyroid tenderness  Cardiovascular:      Rate and Rhythm: Normal rate and regular rhythm  Heart sounds: Normal heart sounds  Pulmonary:      Effort: Pulmonary effort is normal       Breath sounds: Normal breath sounds  Chest:   Breasts:      Right: No mass, nipple discharge, skin change, tenderness or axillary adenopathy  Left: No mass, nipple discharge, skin change, tenderness or axillary adenopathy  Abdominal:      General: There is no distension  Palpations: There is no mass  Tenderness: There is no abdominal tenderness  There is no guarding  Genitourinary:     General: Normal vulva  Exam position: Lithotomy position  Labia:         Right: No tenderness or lesion  Left: No tenderness or lesion  Vagina: No vaginal discharge, tenderness, bleeding or lesions  Cervix: No discharge, lesion, erythema or cervical bleeding  Uterus: Normal  Not enlarged and not tender  Adnexa:         Right: No mass, tenderness or fullness            Left: No mass, tenderness or fullness  Musculoskeletal:      Cervical back: Normal range of motion  Lymphadenopathy:      Upper Body:      Right upper body: No axillary adenopathy  Left upper body: No axillary adenopathy  Skin:     General: Skin is warm and dry  Neurological:      Mental Status: She is alert     Psychiatric:         Mood and Affect: Mood normal          Behavior: Behavior normal          Judgment: Judgment normal              Kelleen Blizzard, CNM  [facility]  05/12/22 6:51 PM

## 2022-07-12 ENCOUNTER — OFFICE VISIT (OUTPATIENT)
Dept: FAMILY MEDICINE CLINIC | Facility: CLINIC | Age: 62
End: 2022-07-12
Payer: COMMERCIAL

## 2022-07-12 VITALS
WEIGHT: 195 LBS | DIASTOLIC BLOOD PRESSURE: 74 MMHG | HEIGHT: 59 IN | SYSTOLIC BLOOD PRESSURE: 124 MMHG | TEMPERATURE: 98.3 F | HEART RATE: 84 BPM | BODY MASS INDEX: 39.31 KG/M2

## 2022-07-12 DIAGNOSIS — R60.9 EDEMA, UNSPECIFIED TYPE: Primary | ICD-10-CM

## 2022-07-12 DIAGNOSIS — E03.9 HYPOTHYROIDISM, UNSPECIFIED TYPE: ICD-10-CM

## 2022-07-12 DIAGNOSIS — I10 ESSENTIAL HYPERTENSION: ICD-10-CM

## 2022-07-12 PROCEDURE — 99214 OFFICE O/P EST MOD 30 MIN: CPT | Performed by: FAMILY MEDICINE

## 2022-07-12 RX ORDER — ASPIRIN 81 MG/1
81 TABLET, CHEWABLE ORAL DAILY
Qty: 90 TABLET | Refills: 1 | Status: SHIPPED | OUTPATIENT
Start: 2022-07-12

## 2022-07-12 NOTE — PROGRESS NOTES
Assessment and Plan:    Problem List Items Addressed This Visit        Endocrine    Hypothyroidism     Thyroid stable              Cardiovascular and Mediastinum    Essential hypertension     BP stable           Relevant Medications    aspirin 81 mg chewable tablet       Other    Edema - Primary     Still retaining fluid  Despite change in diuretic, also not taking aspirin for  3  months           Relevant Orders    Comprehensive metabolic panel    NT-BNP PRO    C-reactive protein    D-dimer, quantitative                 Diagnoses and all orders for this visit:    Edema, unspecified type  -     Comprehensive metabolic panel; Future  -     NT-BNP PRO; Future  -     C-reactive protein; Future  -     D-dimer, quantitative; Future    Essential hypertension  -     aspirin 81 mg chewable tablet; Chew 1 tablet (81 mg total) daily Chew    Hypothyroidism, unspecified type            Subjective:      Patient ID: Da Barker is a 60 y o  female  CC:    Chief Complaint   Patient presents with    Follow-up     3 Month f/u     Medication Refill     Pt needs refill for asa, states she was unable to get last time  HPI:    Here for  F/u thyroid and HTN  Legs still feel heavy      The following portions of the patient's history were reviewed and updated as appropriate: allergies, current medications, past family history, past medical history, past social history, past surgical history and problem list         Review of Systems   Constitutional: Negative for activity change, appetite change and fatigue  Respiratory: Negative for shortness of breath and wheezing  Cardiovascular: Positive for leg swelling  Negative for chest pain  Musculoskeletal: Positive for arthralgias  Foot pain due to edema   Neurological: Negative for dizziness and headaches  Hematological: Negative for adenopathy           Data to review:       Objective:    Vitals:    07/12/22 1605   BP: 124/74   BP Location: Right arm   Patient Position: Sitting   Cuff Size: Adult   Pulse: 84   Temp: 98 3 °F (36 8 °C)   TempSrc: Temporal   Weight: 88 5 kg (195 lb)   Height: 4' 11" (1 499 m)        Physical Exam  Vitals reviewed  Constitutional:       Appearance: Normal appearance  She is obese  Cardiovascular:      Rate and Rhythm: Normal rate and regular rhythm  Pulses: Normal pulses  Heart sounds: Normal heart sounds  Pulmonary:      Effort: Pulmonary effort is normal       Breath sounds: Normal breath sounds  Musculoskeletal:      Right lower leg: Edema present  Left lower leg: Edema present  Comments: 1 plus edema   Lymphadenopathy:      Cervical: No cervical adenopathy  Neurological:      Mental Status: She is alert     Psychiatric:         Mood and Affect: Mood normal

## 2022-09-26 ENCOUNTER — TELEPHONE (OUTPATIENT)
Dept: FAMILY MEDICINE CLINIC | Facility: CLINIC | Age: 62
End: 2022-09-26

## 2022-09-26 NOTE — TELEPHONE ENCOUNTER
Bart from [facility] is calling regarding a vitamin d lab that was ordered on 12/29/21 and drawn on 2/15/22  She is looking for a diagnosis code; the ones provided are not correct  Please call her at [phone] with the correct diagnosis, if any, and lmom if she cannot answer  Thank you!

## 2022-09-26 NOTE — TELEPHONE ENCOUNTER
What dx  codes were  used-one of them should be Vitamin d deficiency-if  that wasn't on list see if it can be added and re-billed

## 2022-10-10 DIAGNOSIS — R60.9 EDEMA, UNSPECIFIED TYPE: ICD-10-CM

## 2022-10-10 RX ORDER — TRIAMTERENE AND HYDROCHLOROTHIAZIDE 37.5; 25 MG/1; MG/1
CAPSULE ORAL
Qty: 90 CAPSULE | Refills: 1 | Status: SHIPPED | OUTPATIENT
Start: 2022-10-10

## 2022-10-10 NOTE — TELEPHONE ENCOUNTER
Requested Prescriptions     Pending Prescriptions Disp Refills   • triamterene-hydrochlorothiazide (DYAZIDE) 37 5-25 mg per capsule [Pharmacy Med Name: TRIAMTERENE-HCTZ 37 5-25 MG CP] 90 capsule 1     Sig: TAKE 1 CAPSULE BY MOUTH EVERY DAY IN THE MORNING     LOV 7/12/22, F/U non scheduled, Labs active

## 2023-01-13 DIAGNOSIS — I10 ESSENTIAL HYPERTENSION: ICD-10-CM

## 2023-01-13 RX ORDER — ASPIRIN 81 MG
TABLET,CHEWABLE ORAL
Qty: 90 TABLET | Refills: 1 | Status: SHIPPED | OUTPATIENT
Start: 2023-01-13

## 2023-04-28 DIAGNOSIS — Z91.09 ENVIRONMENTAL ALLERGIES: ICD-10-CM

## 2023-05-01 RX ORDER — OLOPATADINE HYDROCHLORIDE 7 MG/ML
SOLUTION OPHTHALMIC
Qty: 2.5 ML | Refills: 2 | Status: SHIPPED | OUTPATIENT
Start: 2023-05-01

## 2023-05-14 DIAGNOSIS — E78.2 MIXED HYPERLIPIDEMIA: ICD-10-CM

## 2023-05-15 RX ORDER — PRAVASTATIN SODIUM 20 MG
TABLET ORAL
Qty: 90 TABLET | Refills: 1 | Status: SHIPPED | OUTPATIENT
Start: 2023-05-15

## 2023-06-22 ENCOUNTER — ANNUAL EXAM (OUTPATIENT)
Dept: OBGYN CLINIC | Facility: MEDICAL CENTER | Age: 63
End: 2023-06-22
Payer: COMMERCIAL

## 2023-06-22 VITALS
BODY MASS INDEX: 38.12 KG/M2 | SYSTOLIC BLOOD PRESSURE: 126 MMHG | WEIGHT: 189.1 LBS | DIASTOLIC BLOOD PRESSURE: 74 MMHG | HEIGHT: 59 IN

## 2023-06-22 DIAGNOSIS — R10.2 PELVIC PAIN: ICD-10-CM

## 2023-06-22 DIAGNOSIS — Z12.31 ENCOUNTER FOR SCREENING MAMMOGRAM FOR BREAST CANCER: ICD-10-CM

## 2023-06-22 DIAGNOSIS — Z01.419 ROUTINE GYNECOLOGICAL EXAMINATION: Primary | ICD-10-CM

## 2023-06-22 PROCEDURE — S0612 ANNUAL GYNECOLOGICAL EXAMINA: HCPCS | Performed by: ADVANCED PRACTICE MIDWIFE

## 2023-06-22 PROCEDURE — G0145 SCR C/V CYTO,THINLAYER,RESCR: HCPCS | Performed by: ADVANCED PRACTICE MIDWIFE

## 2023-06-22 PROCEDURE — G0476 HPV COMBO ASSAY CA SCREEN: HCPCS | Performed by: ADVANCED PRACTICE MIDWIFE

## 2023-06-22 NOTE — PROGRESS NOTES
[facility] [address], [place]    ASSESSMENT/PLAN: Niko Negrete is a 58 y o   who presents for annual gynecologic exam     Encounter for routine gynecologic examination  - Routine well woman exam completed today  - Cervical Cancer Screening: Current ASCCP Guidelines reviewed  Last Pap:   Next Pap Due: today  - HPV Vaccination status: Not immunized  - STI screening offered including HIV: not indicated based on hx or requested at time of visit  - Breast Cancer Screening: Last Mammogram 10/29/2021, script given  - Colorectal cancer screening was not ordered  - The following were reviewed in today's visit: breast self exam, mammography screening ordered, adequate intake of calcium and vitamin D and exercise  - RTO 1 yr    Additional problems addressed at this visit:  1  Routine gynecological examination  -     Liquid-based pap, screening  -     Mammo screening bilateral w 3d & cad; Future; Expected date: 2023    2  Encounter for screening mammogram for breast cancer  -     Mammo screening bilateral w 3d & cad; Future; Expected date: 2023    3  Pelvic pain  -     US pelvis complete w transvaginal; Future; Expected date: 2023          CC: Annual Gynecologic Examination    HPI: Niko Negrete is a 58 y o   who presents for annual gynecologic examination  Guerda Peralta presents today for gyn exam  No vaginal bleeding since onset of menopause  2021 last pap smear- normal, Hx of abnormal pap smear no  Sexually active- no, with partner for 32 yrs  Does not desire STI testing  10/2021 mammogram- normal, 2021 colonoscopy- 5 yr repeat  Reports 8-9 hrs of sleep daily, 1 servings of calcium rich foods daily takes calcium and vitamin D3  Exercises - walks dog daily  1 servings of caffeine daily  Performs SBE  Safe at home- no  Wears seatbelt - yes Concerns: Noted a heavy feeling on right side over the winter- specifically when getting up   Notes that it has "resolved        The following portions of the patient's history were reviewed and updated as appropriate: allergies, current medications, past family history, past medical history, obstetric history, gynecologic history, past social history, past surgical history and problem list     Review of Systems   Constitutional: Negative for chills, fatigue and fever  Respiratory: Negative for cough and shortness of breath  Cardiovascular: Negative for chest pain, palpitations and leg swelling  Gastrointestinal: Positive for constipation and diarrhea  Genitourinary: Negative for difficulty urinating, dysuria, frequency, menstrual problem, pelvic pain, vaginal bleeding, vaginal discharge and vaginal pain  Neurological: Negative for light-headedness and headaches  Psychiatric/Behavioral: The patient is not nervous/anxious  Objective:  /74   Ht 4' 11\" (1 499 m)   Wt 85 8 kg (189 lb 1 6 oz)   LMP  (LMP Unknown)   BMI 38 19 kg/m²    Physical Exam  Vitals reviewed  Constitutional:       Appearance: Normal appearance  HENT:      Head: Normocephalic  Neck:      Thyroid: No thyroid mass or thyroid tenderness  Cardiovascular:      Rate and Rhythm: Normal rate and regular rhythm  Heart sounds: Normal heart sounds  Pulmonary:      Effort: Pulmonary effort is normal       Breath sounds: Normal breath sounds  Chest:   Breasts:     Right: No mass, nipple discharge, skin change or tenderness  Left: No mass, nipple discharge, skin change or tenderness  Abdominal:      General: There is no distension  Palpations: There is no mass  Tenderness: There is no abdominal tenderness  There is no guarding  Genitourinary:     General: Normal vulva  Exam position: Lithotomy position  Labia:         Right: No tenderness or lesion  Left: No tenderness or lesion  Vagina: No vaginal discharge, tenderness, bleeding or lesions        Cervix: No discharge, lesion, " erythema or cervical bleeding  Uterus: Normal  Not enlarged and not tender  Adnexa:         Right: No mass, tenderness or fullness  Left: No mass, tenderness or fullness  Musculoskeletal:      Cervical back: Normal range of motion  Lymphadenopathy:      Upper Body:      Right upper body: No axillary adenopathy  Left upper body: No axillary adenopathy  Skin:     General: Skin is warm and dry  Neurological:      Mental Status: She is alert     Psychiatric:         Mood and Affect: Mood normal          Behavior: Behavior normal          Judgment: Judgment normal              Robert Pelletier CNM  [facility]  06/23/23 10:14 AM

## 2023-06-29 LAB
LAB AP GYN PRIMARY INTERPRETATION: NORMAL
Lab: NORMAL

## 2023-06-30 ENCOUNTER — TELEPHONE (OUTPATIENT)
Dept: OBGYN CLINIC | Facility: MEDICAL CENTER | Age: 63
End: 2023-06-30

## 2023-06-30 NOTE — TELEPHONE ENCOUNTER
----- Message from Carolyn Carcamo CNM sent at 6/30/2023  7:41 AM EDT -----  Please notify Adair Sanabria of normal pap smear and HPV testing

## 2023-07-23 DIAGNOSIS — I10 ESSENTIAL HYPERTENSION: ICD-10-CM

## 2023-07-23 DIAGNOSIS — R60.9 EDEMA, UNSPECIFIED TYPE: ICD-10-CM

## 2023-07-24 RX ORDER — ASPIRIN 81 MG
TABLET,CHEWABLE ORAL
Qty: 30 TABLET | Refills: 0 | Status: SHIPPED | OUTPATIENT
Start: 2023-07-24

## 2023-07-24 RX ORDER — TRIAMTERENE AND HYDROCHLOROTHIAZIDE 37.5; 25 MG/1; MG/1
CAPSULE ORAL
Qty: 90 CAPSULE | Refills: 0 | Status: SHIPPED | OUTPATIENT
Start: 2023-07-24

## 2023-08-07 DIAGNOSIS — I10 ESSENTIAL HYPERTENSION: ICD-10-CM

## 2023-08-07 RX ORDER — ASPIRIN 81 MG
TABLET,CHEWABLE ORAL
Qty: 90 TABLET | Refills: 1 | OUTPATIENT
Start: 2023-08-07

## 2023-08-31 ENCOUNTER — HOSPITAL ENCOUNTER (OUTPATIENT)
Dept: ULTRASOUND IMAGING | Facility: MEDICAL CENTER | Age: 63
Discharge: HOME | End: 2023-08-31
Payer: COMMERCIAL

## 2023-08-31 ENCOUNTER — HOSPITAL ENCOUNTER (OUTPATIENT)
Dept: MAMMOGRAPHY | Facility: MEDICAL CENTER | Age: 63
Discharge: HOME | End: 2023-08-31
Payer: COMMERCIAL

## 2023-08-31 DIAGNOSIS — Z12.31 ENCOUNTER FOR SCREENING MAMMOGRAM FOR BREAST CANCER: ICD-10-CM

## 2023-08-31 DIAGNOSIS — R10.2 PELVIC PAIN: ICD-10-CM

## 2023-08-31 DIAGNOSIS — Z01.419 ROUTINE GYNECOLOGICAL EXAMINATION: ICD-10-CM

## 2023-08-31 PROCEDURE — 77063 BREAST TOMOSYNTHESIS BI: CPT

## 2023-08-31 PROCEDURE — 76856 US EXAM PELVIC COMPLETE: CPT

## 2023-08-31 PROCEDURE — 77067 SCR MAMMO BI INCL CAD: CPT

## 2023-08-31 PROCEDURE — 76830 TRANSVAGINAL US NON-OB: CPT

## 2023-12-03 DIAGNOSIS — I10 ESSENTIAL HYPERTENSION: ICD-10-CM

## 2023-12-04 RX ORDER — ASPIRIN 81 MG
TABLET,CHEWABLE ORAL
Qty: 30 TABLET | Refills: 0 | Status: SHIPPED | OUTPATIENT
Start: 2023-12-04

## 2024-01-08 DIAGNOSIS — I10 ESSENTIAL HYPERTENSION: ICD-10-CM

## 2024-01-08 RX ORDER — ASPIRIN 81 MG
81 TABLET,CHEWABLE ORAL DAILY
Qty: 90 TABLET | Refills: 1 | Status: SHIPPED | OUTPATIENT
Start: 2024-01-08

## 2024-01-31 ENCOUNTER — OFFICE VISIT (OUTPATIENT)
Dept: FAMILY MEDICINE CLINIC | Facility: CLINIC | Age: 64
End: 2024-01-31
Payer: COMMERCIAL

## 2024-01-31 VITALS
SYSTOLIC BLOOD PRESSURE: 148 MMHG | BODY MASS INDEX: 38.27 KG/M2 | OXYGEN SATURATION: 99 % | TEMPERATURE: 97.4 F | WEIGHT: 189.5 LBS | HEART RATE: 88 BPM | DIASTOLIC BLOOD PRESSURE: 80 MMHG

## 2024-01-31 DIAGNOSIS — I10 ESSENTIAL HYPERTENSION: ICD-10-CM

## 2024-01-31 DIAGNOSIS — E78.2 MIXED HYPERLIPIDEMIA: ICD-10-CM

## 2024-01-31 DIAGNOSIS — Z00.00 WELL ADULT EXAM: Primary | ICD-10-CM

## 2024-01-31 PROBLEM — M79.641 PAIN OF RIGHT HAND: Status: RESOLVED | Noted: 2017-11-06 | Resolved: 2024-01-31

## 2024-01-31 PROBLEM — M79.671 RIGHT FOOT PAIN: Status: RESOLVED | Noted: 2017-11-06 | Resolved: 2024-01-31

## 2024-01-31 PROBLEM — S89.92XA INJURY OF LEFT KNEE: Status: RESOLVED | Noted: 2018-01-18 | Resolved: 2024-01-31

## 2024-01-31 PROCEDURE — 99396 PREV VISIT EST AGE 40-64: CPT | Performed by: FAMILY MEDICINE

## 2024-01-31 NOTE — PROGRESS NOTES
ADULT ANNUAL PHYSICAL  [facility]    NAME: Vlaeria Grimm  AGE: 63 y.o. SEX: female  : 1960     DATE: 2024     Assessment and Plan:     Problem List Items Addressed This Visit          Cardiovascular and Mediastinum    Essential hypertension     BP stable  w/o meds         Relevant Orders    Comprehensive metabolic panel    UA (URINE) with reflex to Scope    CBC and differential    TSH, 3rd generation       Other    Mixed hyperlipidemia     Await  lab         Relevant Orders    Lipid panel    TSH, 3rd generation     Other Visit Diagnoses       Well adult exam    -  Primary            Immunizations and preventive care screenings were discussed with patient today. Appropriate education was printed on patient's after visit summary.    Counseling:  Alcohol/drug use: no concerns  Dental Health: discussed importance of regular tooth brushing, flossing, and dental visits.  Injury prevention: discussed safety/seat belts, safety helmets, smoke detectors, carbon dioxide detectors,  Sexual health: no concerns  Exercise: the importance of regular exercise/physical activity was discussed. Recommend exercise 3-5 times per week for at least 30 minutes.          Return in about 1 year (around 2025).     Chief Complaint:     Chief Complaint   Patient presents with    Physical Exam      History of Present Illness:     Adult Annual Physical   Patient here for a comprehensive physical exam. The patient reports no problems.except  back pain with combo  water pill,still with edema. Occ wears  compression stockings    Diet and Physical Activity  Diet/Nutrition: well balanced diet, consuming 3-5 servings of fruits/vegetables daily, adequate fiber intake, adequate whole grain intake, and salt .   Exercise: walking, 5-7 times a week on average, less than 30 minutes on average, and walks dog once  per  day .      Depression Screening  PHQ-2/9 Depression Screening   Please fax Cardiac Clearance form to [phone] for Colonoscopy/EGD scheduled for 10/11. Pt was cleared in Sept by Anny form has never been sent to GI doctor     Little interest or pleasure in doing things: 0 - not at all  Feeling down, depressed, or hopeless: 0 - not at all  PHQ-2 Score: 0  PHQ-2 Interpretation: Negative depression screen       General Health  Sleep: sleeps well and gets 7-8 hours of sleep on average.   Hearing:  deaf .  Vision: goes for regular eye exams, most recent eye exam <1 year ago, wears glasses, and early  cataracts and dry eyes .   Dental: regular dental visits and brushes teeth twice daily.       /GYN Health  Follows with gynecology? yes   Patient is: postmenopausal  Last menstrual period: years  Contraceptive method: menopause.  Pap , mammo and  colonoscopy utd  Advanced Care Planning  Do you have an advanced directive? yes  Do you have a durable medical power of ? yes     Review of Systems:     Review of Systems   Constitutional:  Negative for activity change, appetite change and fatigue.   HENT:  Negative for congestion, ear pain, postnasal drip, rhinorrhea, sinus pressure and sinus pain.    Respiratory:  Negative for shortness of breath.    Cardiovascular:  Positive for leg swelling. Negative for chest pain.   Genitourinary:  Negative for difficulty urinating.   Neurological:  Negative for dizziness, light-headedness and headaches.      Past Medical History:     Past Medical History:   Diagnosis Date    Deaf     Disease of thyroid gland     Hearing loss     Hyperlipidemia     Hypertension     Stroke (HCC) 2014    TIA (transient ischemic attack) 2014      Past Surgical History:     Past Surgical History:   Procedure Laterality Date    COLONOSCOPY  04/02/2012    1 polyp-tubular adenoma    COLONOSCOPY  08/06/2015    diverticulosis    COLONOSCOPY  08/2015    Dr Hansen [facility]. diverticulosis.    COLONOSCOPY  04/2012    Dr. Arellano [facility].  Right colon tubular adenoma polyp removed.    GALLBLADDER SURGERY  2005      Social History:     Social History     Socioeconomic History    Marital status: /Civil Union     Spouse name:  None    Number of children: 2    Years of education: None    Highest education level: None   Occupational History    Occupation: retired   Tobacco Use    Smoking status: Former    Smokeless tobacco: Never   Vaping Use    Vaping status: Never Used   Substance and Sexual Activity    Alcohol use: Yes     Comment: social    Drug use: Never    Sexual activity: Not Currently   Other Topics Concern    None   Social History Narrative    Always uses seat belt    House    Lives with spouse, daughter    No advance directives    Yarsanism    Supportive and safe    Uses      Social Determinants of Health     Financial Resource Strain: Not on file   Food Insecurity: Not on file   Transportation Needs: Not on file   Physical Activity: Not on file   Stress: Not on file   Social Connections: Not on file   Intimate Partner Violence: Not on file   Housing Stability: Not on file      Family History:     Family History   Problem Relation Age of Onset    Diabetes Mother     Alcohol abuse Mother     Alcohol abuse Sister     Alcohol abuse Brother     Stroke Family     Hypertension Family       Current Medications:     Current Outpatient Medications   Medication Sig Dispense Refill    Aspirin Low Dose 81 MG chewable tablet CHEW 1 TABLET (81 MG TOTAL) DAILY 90 tablet 1    Calcium Carb-Cholecalciferol (CALCIUM 600+D3 PO) Take by mouth daily      ketoconazole (NIZORAL) 2 % cream APPLY TO AFFECTED AREA OF THE FEET TWICE A DAY TILL CLEAR  2    Multiple Vitamin (MULTIVITAMIN ADULT PO) Take by mouth daily      Pataday 0.7 % SOLN APPLY 1 DROP TO EYE 2 TIMES A DAY AS NEEDED (ALLERGIC SYMPTOMS) 2.5 mL 2    pravastatin (PRAVACHOL) 20 mg tablet TAKE 1 TABLET BY MOUTH DAILY AT BEDTIME 90 tablet 1    ProAir  (90 Base) MCG/ACT inhaler Inhale 2 puffs every 4 (four) hours as needed for wheezing or shortness of breath 1 Inhaler 2     No current facility-administered medications for this visit.      Allergies:      Allergies   Allergen Reactions    Codeine Vomiting    Codeine Polt-Chlorphen Polt Er     Penicillins Nausea Only and Vomiting    Adhesive [Medical Tape] Rash    Latex Rash      Physical Exam:     /80 (BP Location: Left arm, Patient Position: Sitting, Cuff Size: Large)   Pulse 88   Temp (!) 97.4 °F (36.3 °C) (Temporal)   Wt 86 kg (189 lb 8 oz)   LMP  (LMP Unknown)   SpO2 99%   BMI 38.27 kg/m²     Physical Exam  Vitals reviewed.   Constitutional:       Appearance: Normal appearance. She is obese.   Neck:      Vascular: No carotid bruit.   Cardiovascular:      Rate and Rhythm: Normal rate and regular rhythm.      Pulses: Normal pulses.      Heart sounds: Normal heart sounds.   Pulmonary:      Effort: Pulmonary effort is normal.      Breath sounds: Normal breath sounds.   Musculoskeletal:      Right lower leg: Edema present.      Left lower leg: Edema present.      Comments: 1  plus edema   Lymphadenopathy:      Cervical: No cervical adenopathy.   Neurological:      Mental Status: She is alert.   Psychiatric:         Mood and Affect: Mood normal.          Merry Ramos MD  [facility]

## 2024-02-21 ENCOUNTER — TELEPHONE (OUTPATIENT)
Dept: FAMILY MEDICINE CLINIC | Facility: CLINIC | Age: 64
End: 2024-02-21

## 2024-02-21 NOTE — TELEPHONE ENCOUNTER
----- Message from Merry Ramos MD sent at 2/21/2024  1:14 PM EST -----  Lab all normal, repeat 1yr

## 2024-03-08 ENCOUNTER — TELEPHONE (OUTPATIENT)
Dept: FAMILY MEDICINE CLINIC | Facility: CLINIC | Age: 64
End: 2024-03-08

## 2024-03-08 NOTE — TELEPHONE ENCOUNTER
Patients  stopped in he wanted to know the results of her blood work that was done in February and also wanted to know if she could get a refill of her inhaler he didn't know the name of it. He said it is for her Asthma. You may reach him at [phone]. [facility].

## 2024-03-08 NOTE — TELEPHONE ENCOUNTER
Patient does not have a diagnosis on the chart for an inhaler.  There is not in the problem list diagnosis of COPD or asthma.  Recommend follow-up with PCP to discuss this.    Laboratory studies were already called and a voicemail.  Again reviewed with PCP at future visit.

## 2024-03-13 ENCOUNTER — TELEPHONE (OUTPATIENT)
Age: 64
End: 2024-03-13

## 2024-03-13 NOTE — TELEPHONE ENCOUNTER
Called  back to see if he was with Patient to get consent to speak with  since communication consent does not list him.  was not with patient.Called patient directly at home, no answer,   # [phone] relayed call back message.

## 2024-03-13 NOTE — TELEPHONE ENCOUNTER
Pt's  called back regarding bloodwork results done in February. Wanted to go over results again and also wanted to know if pt should have the flu shot. Please call back at [phone]. Thank you.

## 2024-04-11 ENCOUNTER — TELEPHONE (OUTPATIENT)
Age: 64
End: 2024-04-11

## 2024-08-23 DIAGNOSIS — I10 ESSENTIAL HYPERTENSION: ICD-10-CM

## 2024-08-23 RX ORDER — ASPIRIN 81 MG
81 TABLET,CHEWABLE ORAL DAILY
Qty: 90 TABLET | Refills: 1 | Status: SHIPPED | OUTPATIENT
Start: 2024-08-23

## 2024-10-02 ENCOUNTER — TELEPHONE (OUTPATIENT)
Age: 64
End: 2024-10-02

## 2024-10-02 NOTE — TELEPHONE ENCOUNTER
Called number on file, was told wrong number. But yes pt can get RSV but will have to go to a pharmacy cause we do not have them avail here

## 2024-10-02 NOTE — TELEPHONE ENCOUNTER
Pt's  called to inquire if pt is at appropriate age to receive RSV vaccine. Please advise, thank you.

## 2024-10-11 ENCOUNTER — TELEPHONE (OUTPATIENT)
Age: 64
End: 2024-10-11

## 2024-10-11 NOTE — TELEPHONE ENCOUNTER
CG calls to report that the patient has stopped her cholesterol medication pravastatin. Patient stopped the medication because the statin was giving the patient body aches. CG is asking if a different medication can be prescribed? Please call the CG (patient and spouse are deaf) at [phone] to let the CG know that the medication has been ordered

## 2024-10-14 DIAGNOSIS — E78.2 MIXED HYPERLIPIDEMIA: Primary | ICD-10-CM

## 2025-01-04 LAB
CHOLEST SERPL-MCNC: 186 MG/DL
CHOLEST/HDLC SERPL: 2.5 {RATIO}
HDLC SERPL-MCNC: 75 MG/DL (ref 23–92)
LDLC SERPL CALC-MCNC: 95 MG/DL
NONHDLC SERPL-MCNC: 111 MG/DL
TRIGL SERPL-MCNC: 82 MG/DL

## 2025-01-05 ENCOUNTER — RESULTS FOLLOW-UP (OUTPATIENT)
Dept: FAMILY MEDICINE CLINIC | Facility: CLINIC | Age: 65
End: 2025-01-05

## 2025-01-15 NOTE — TELEPHONE ENCOUNTER
Relayed results to patient representative as listed on communication consent form as per provider message. Patient Representative expressed understanding and did not have any further questions.

## 2025-02-03 ENCOUNTER — OFFICE VISIT (OUTPATIENT)
Dept: FAMILY MEDICINE CLINIC | Facility: CLINIC | Age: 65
End: 2025-02-03
Payer: MEDICARE

## 2025-02-03 VITALS
HEART RATE: 70 BPM | WEIGHT: 196 LBS | DIASTOLIC BLOOD PRESSURE: 74 MMHG | OXYGEN SATURATION: 97 % | TEMPERATURE: 98.3 F | SYSTOLIC BLOOD PRESSURE: 118 MMHG | BODY MASS INDEX: 39.51 KG/M2 | HEIGHT: 59 IN

## 2025-02-03 DIAGNOSIS — I10 ESSENTIAL HYPERTENSION: ICD-10-CM

## 2025-02-03 DIAGNOSIS — Z78.0 POSTMENOPAUSAL: ICD-10-CM

## 2025-02-03 DIAGNOSIS — E78.2 MIXED HYPERLIPIDEMIA: ICD-10-CM

## 2025-02-03 DIAGNOSIS — Z00.00 WELCOME TO MEDICARE PREVENTIVE VISIT: Primary | ICD-10-CM

## 2025-02-03 DIAGNOSIS — Z23 ENCOUNTER FOR IMMUNIZATION: ICD-10-CM

## 2025-02-03 DIAGNOSIS — Z12.31 ENCOUNTER FOR SCREENING MAMMOGRAM FOR BREAST CANCER: ICD-10-CM

## 2025-02-03 DIAGNOSIS — E66.01 OBESITY, MORBID (HCC): ICD-10-CM

## 2025-02-03 DIAGNOSIS — E03.9 HYPOTHYROIDISM, UNSPECIFIED TYPE: ICD-10-CM

## 2025-02-03 PROCEDURE — G0402 INITIAL PREVENTIVE EXAM: HCPCS | Performed by: FAMILY MEDICINE

## 2025-02-03 PROCEDURE — G0009 ADMIN PNEUMOCOCCAL VACCINE: HCPCS | Performed by: FAMILY MEDICINE

## 2025-02-03 PROCEDURE — 90677 PCV20 VACCINE IM: CPT | Performed by: FAMILY MEDICINE

## 2025-02-03 RX ORDER — ASPIRIN 81 MG/1
81 TABLET, CHEWABLE ORAL DAILY
Qty: 90 TABLET | Refills: 1 | Status: SHIPPED | OUTPATIENT
Start: 2025-02-03

## 2025-02-03 NOTE — PROGRESS NOTES
Assessment and Plan:     Problem List Items Addressed This Visit          Cardiovascular and Mediastinum    Essential hypertension    Elevate d in past ,normal today, takes  aspirin for heart  health         Relevant Medications    aspirin (Aspirin Low Dose) 81 mg chewable tablet       Endocrine    Hypothyroidism    Relevant Orders    Lipid panel    TSH, 3rd generation       Other    Mixed hyperlipidemia    Relevant Orders    Lipid panel    Comprehensive metabolic panel    Obesity, morbid (HCC)     Other Visit Diagnoses         Welcome to Medicare preventive visit    -  Primary      Encounter for immunization        Relevant Orders    Pneumococcal Conjugate Vaccine 20-valent (Pcv20) (Completed)      Postmenopausal        Relevant Orders    DXA bone density spine hip and pelvis      Encounter for screening mammogram for breast cancer        Relevant Orders    Mammo screening bilateral w 3d and cad             Preventive health issues were discussed with patient, and age appropriate screening tests were ordered as noted in patient's After Visit Summary.  Personalized health advice and appropriate referrals for health education or preventive services given if needed, as noted in patient's After Visit Summary.     History of Present Illness:     Patient presents for a Medicare Wellness Visit    Patient presents with:  Medicare Wellness Visit: Welcome to medicare   Medication Refill: On pending , needs  prevnar, feels well, no cp, no sob, no headaches, mild  lower  abd  discomfort  when changes  position          Patient Care Team:  Merry Ramos MD as PCP - General (Family Medicine)     Review of Systems:     Review of Systems   Constitutional:  Negative for activity change, appetite change and fatigue.   HENT:  Negative for hearing loss.    Respiratory:  Negative for shortness of breath.    Cardiovascular:  Negative for chest pain.   Neurological:  Negative for dizziness, light-headedness and headaches.    Hematological:  Negative for adenopathy.   Psychiatric/Behavioral:  Negative for dysphoric mood. The patient is not nervous/anxious.       Problem List:     Patient Active Problem List   Diagnosis    Essential hypertension    Ganglion of right hand    Hip pain, right    Mixed hyperlipidemia    Hypothyroidism    Obesity, morbid (HCC)    Deaf    Need for SBE (subacute bacterial endocarditis) prophylaxis    Edema    Age-related nuclear cataract of both eyes    Borderline open-angle glaucoma    Hereditary corneal dystrophy      Past Medical and Surgical History:     Past Medical History:   Diagnosis Date    Deaf     Disease of thyroid gland     Hearing loss     Hyperlipidemia     Hypertension     Stroke (HCC) 2014    TIA (transient ischemic attack) 2014     Past Surgical History:   Procedure Laterality Date    COLONOSCOPY  04/02/2012    1 polyp-tubular adenoma    COLONOSCOPY  08/06/2015    diverticulosis    COLONOSCOPY  08/2015    Dr Hansen, [facility]. diverticulosis.    COLONOSCOPY  04/2012    Dr. Arellano, [facility].  Right colon tubular adenoma polyp removed.    FL LUMBAR PUNCTURE DIAGNOSTIC  2/6/2014    GALLBLADDER SURGERY  2005      Family History:     Family History   Problem Relation Age of Onset    Diabetes Mother     Alcohol abuse Mother     Alcohol abuse Sister     Alcohol abuse Brother     Stroke Family     Hypertension Family       Social History:     Social History     Socioeconomic History    Marital status: /Civil Union     Spouse name: None    Number of children: 2    Years of education: None    Highest education level: None   Occupational History    Occupation: retired   Tobacco Use    Smoking status: Former    Smokeless tobacco: Never   Vaping Use    Vaping status: Never Used   Substance and Sexual Activity    Alcohol use: Yes     Comment: social    Drug use: Never    Sexual activity: Not Currently   Other Topics Concern    None   Social History Narrative    Always uses seat belt    House    Lives  with spouse, daughter    No advance directives    Zoroastrian    Supportive and safe    Uses      Social Drivers of Health     Financial Resource Strain: Not on file   Food Insecurity: No Food Insecurity (2/3/2025)    Hunger Vital Sign     Worried About Running Out of Food in the Last Year: Never true     Ran Out of Food in the Last Year: Never true   Transportation Needs: No Transportation Needs (2/3/2025)    PRAPARE - Transportation     Lack of Transportation (Medical): No     Lack of Transportation (Non-Medical): No   Physical Activity: Not on file   Stress: Not on file   Social Connections: Not on file   Intimate Partner Violence: Not on file   Housing Stability: Low Risk  (2/3/2025)    Housing Stability Vital Sign     Unable to Pay for Housing in the Last Year: No     Number of Times Moved in the Last Year: 0     Homeless in the Last Year: No      Medications and Allergies:     Current Outpatient Medications   Medication Sig Dispense Refill    aspirin (Aspirin Low Dose) 81 mg chewable tablet Chew 1 tablet (81 mg total) daily 90 tablet 1    Calcium Carb-Cholecalciferol (CALCIUM 600+D3 PO) Take by mouth daily      ketoconazole (NIZORAL) 2 % cream APPLY TO AFFECTED AREA OF THE FEET TWICE A DAY TILL CLEAR  2    Multiple Vitamin (MULTIVITAMIN ADULT PO) Take by mouth daily      Pataday 0.7 % SOLN APPLY 1 DROP TO EYE 2 TIMES A DAY AS NEEDED (ALLERGIC SYMPTOMS) 2.5 mL 2    ProAir  (90 Base) MCG/ACT inhaler Inhale 2 puffs every 4 (four) hours as needed for wheezing or shortness of breath 1 Inhaler 2     No current facility-administered medications for this visit.     Allergies   Allergen Reactions    Codeine Vomiting    Codeine Polt-Chlorphen Polt Er     Penicillins Nausea Only and Vomiting    Adhesive [Medical Tape] Rash    Latex Rash      Immunizations:     Immunization History   Administered Date(s) Administered    COVID-19 MODERNA VACC 0.5 ML IM 04/02/2021, 04/30/2021,  11/01/2021, 04/26/2022    COVID-19 Moderna Vac BIVALENT 12 Yr+ IM 0.5 ML 12/17/2022    COVID-19 Moderna mRNA Vaccine 12 Yr+ 50 mcg/0.5 mL (Spikevax) 10/12/2023, 10/30/2024    INFLUENZA 10/02/2017, 11/15/2018, 11/15/2018    Influenza Quadrivalent Preservative Free 3 years and older IM 11/09/2016    Influenza Quadrivalent, 6-35 Months IM 11/06/2017    Influenza, injectable, quadrivalent, preservative free 0.5 mL 09/20/2023    Influenza, recombinant, quadrivalent,injectable, preservative free 11/19/2018, 11/21/2019, 10/19/2020, 02/21/2022    Pneumococcal Conjugate Vaccine 20-valent (Pcv20), Polysace 02/03/2025    Pneumococcal Polysaccharide PPV23 12/17/2018    RSV Vaccine, Unspecified 11/13/2024    Respiratory Syncytial Virus Vaccine (Recombinant) 11/13/2024    Tdap 02/21/2022    Zoster 09/16/2014    Zoster Vaccine Recombinant 12/17/2018    influenza, trivalent, adjuvanted 10/30/2024      Health Maintenance:         Topic Date Due    HIV Screening  Never done    Breast Cancer Screening: Mammogram  08/31/2024    Colorectal Cancer Screening  06/14/2026    Hepatitis C Screening  Completed     There are no preventive care reminders to display for this patient.     Medicare Screening Tests and Risk Assessments:     Valeria is here for her Welcome to Medicare visit.     Health Risk Assessment:   Patient rates overall health as very good. Patient feels that their physical health rating is same. Patient is satisfied with their life. Eyesight was rated as same. Hearing was rated as same. Patient feels that their emotional and mental health rating is same. Patients states they are never, rarely angry. Patient states they are never, rarely unusually tired/fatigued. Pain experienced in the last 7 days has been none. Patient states that she has experienced no weight loss or gain in last 6 months. Hearing  impaired    Depression Screening:   PHQ-2 Score: 0      Fall Risk Screening:   In the past year, patient has experienced: no  history of falling in past year      Urinary Incontinence Screening:   Patient has not leaked urine accidently in the last six months.     Home Safety:  Patient does not have trouble with stairs inside or outside of their home. Patient has working smoke alarms and has working carbon monoxide detector. Home safety hazards include: none.     Nutrition:   Current diet is Regular.     Medications:   Patient is not currently taking any over-the-counter supplements. Patient is able to manage medications.     Activities of Daily Living (ADLs)/Instrumental Activities of Daily Living (IADLs):   Walk and transfer into and out of bed and chair?: Yes  Dress and groom yourself?: Yes    Bathe or shower yourself?: Yes    Feed yourself? Yes  Do your laundry/housekeeping?: Yes  Manage your money, pay your bills and track your expenses?: Yes  Make your own meals?: Yes    Do your own shopping?: Yes    Previous Hospitalizations:   Any hospitalizations or ED visits within the last 12 months?: No      Advance Care Planning:   Living will: No    Durable POA for healthcare: No    ACP document given: Yes      Cognitive Screening:   Provider or family/friend/caregiver concerned regarding cognition?: No    PREVENTIVE SCREENINGS      Cardiovascular Screening:    General: History Lipid Disorder and Screening Current      Diabetes Screening:     General: Screening Current      Colorectal Cancer Screening:     General: Screening Current      Breast Cancer Screening:     General: Screening Current      Cervical Cancer Screening:    General: Screening Not Indicated      Lung Cancer Screening:     General: Screening Not Indicated      Hepatitis C Screening:    General: Screening Current    Screening, Brief Intervention, and Referral to Treatment (SBIRT)    Screening  Typical number of drinks in a day: 0  Typical number of drinks in a week: 0  Interpretation: Low risk drinking behavior.    Single Item Drug Screening:  How often have you used an  "illegal drug (including marijuana) or a prescription medication for non-medical reasons in the past year? never    Single Item Drug Screen Score: 0  Interpretation: Negative screen for possible drug use disorder    Vision Screening    Right eye Left eye Both eyes   Without correction      With correction 20/20 20/20 20/20        Physical Exam:     /74 (BP Location: Right arm, Patient Position: Sitting, Cuff Size: Adult)   Pulse 70   Temp 98.3 °F (36.8 °C) (Temporal)   Ht 4' 11\" (1.499 m)   Wt 88.9 kg (196 lb)   LMP  (LMP Unknown)   SpO2 97%   BMI 39.59 kg/m²     Physical Exam     Merry Ramos MD  "

## 2025-02-03 NOTE — PATIENT INSTRUCTIONS
Lab in  6  months  Patient Education     Strengthening Your Lower Body and Core   About this topic   Strengthening your muscles is an important part of an exercise program. Follow these steps for a good lower body strengthening program:  Schedule your program every other day. This will let your muscles rest and regain strength.  If you do work out every day, exercise different parts of your body. For example, work out your arms one day and the legs the next day.  Stop if you feel pain during an exercise. Do not overwork your muscles. This may cause harmful damage to your muscles.  Breathe out when you are doing the hard part of the exercise. Breathe in when you relax.  Muscle strain may happen if you do not follow the proper steps when doing these exercises. It may also happen if you try to do too many exercises right away.  General   Before starting with a program, ask your doctor if you are healthy enough to do these exercises. Your doctor may have you work with a , chiropractor, or physical therapist to make a safe exercise program to meet your needs.  Some of these exercises may cause lower back pain. If you have back problems like a compression fracture or a ruptured disc, doing some of these exercises could make your problem worse.  Some exercises can be done holding a small weight. You can use a can of soup or a hand weight. If the exercise gets too easy, use heavier weights or do the exercise more times.  Strengthening Exercises   Strengthening exercises keep your muscles firm and strong. Start by repeating each exercise 2 to 3 times. Work up to doing each exercise 10 times. Try to do the exercises 2 to 3 times each day. Do all exercises slowly.  Hip lifts ? Lie on your back with your knees bent and feet flat on the floor. Tighten your stomach muscles and lift your buttocks off the floor. Hold 3 to 5 seconds. Relax.  Straight leg raises lying down ? Lie on your back with one leg straight. Bend your  other knee so the foot is flat on the bed. Keeping your leg straight, lift the leg up to the level of your other knee. Lower it back down. Repeat with the other leg.  Abdominal crunches ? Lie on your back with both knees bent. Keep your feet flat on the floor. Place your hands in one of these positions. Try starting with the first position since it is the easiest. As you get better, use the other positions to make it harder.  Crunches with arms at sides.  Crunches with arms across chest.  Crunches with arms behind head. Be careful not to interlock your fingers behind your neck or head while doing crunches. This may add tension to your neck and cause strain.  Look at the ceiling. Tighten your belly muscles and lift your shoulders and upper back off the floor. Breathe out while you are doing this. Lower your shoulders to the floor. Breathe in while you are doing this. Relax your belly muscles all the way before starting another crunch.  Mini-squats ? Stand up straight in front of a counter and hold on with your hands. Have your feet spread about a foot apart. Bend your knees while keeping your back straight. Return to straight standing position. At first, start by just having a slight bend at the knee. To make it harder, bend your knees deeper or hold the position longer. Do not go any lower than 90 degrees.  Heel raises ? Hold onto a counter. Lift your heels up and rise up onto your toes. Lower yourself back down.  Arm and leg lifts on hands and knees ? Start on your hands and knees. With all of these exercises, keep your back as level as possible. If you are having trouble with this, you may want to put a small object on your back such as a book. If it falls off, you are not keeping your back level enough during the exercise.  Lift one arm up to shoulder level and hold. Lower it back down. Now, lift up the other arm and hold.  Lift one leg up and kick it straight out until it is in line with your back and hold.  Lower it back down. Now, lift up the other leg and hold.  Lift one arm and the OPPOSITE leg up at the same time and hold. Lower them down. Now, repeat using the other arm and leg. This is a very hard exercise. It may take time to be able to do this.               What will the results be?   Stronger muscles and bones  Better posture  Better balance and less risk for a fall or injury  Burn body fat and control weight  Better sleep at night and feel better during the day  Lower risk for health problems like arthritis, diabetes, osteoporosis, back pain, obesity, low mood  Helpful tips   Stay active and work out to keep your muscles strong and flexible.  Keep a healthy weight to avoid putting too much stress on your joints. Eat a healthy diet to keep your muscles healthy.  Be sure you do not hold your breath when exercising. This can raise your blood pressure. If you tend to hold your breath, try counting out loud when exercising. If any exercise bothers you, stop right away.  Try walking and swinging your arms at an easy pace for a few minutes to warm up your muscles. Do this again after exercising.  Doing exercises before a meal may be a good way to get into a routine.  If you are using weights, choose a weight that will allow you to repeat the exercise 10 times before resting. If you easily do 10 repeats, you may not be using enough weight. If you are not able to do 10 repeats, you are using too heavy of a weight.  Exercise may be slightly uncomfortable, but you should not have sharp pains. If you do get sharp pains, stop what you are doing. If the sharp pains continue, call your doctor.  Last Reviewed Date   2021-06-28  Consumer Information Use and Disclaimer   This generalized information is a limited summary of diagnosis, treatment, and/or medication information. It is not meant to be comprehensive and should be used as a tool to help the user understand and/or assess potential diagnostic and treatment options. It  does NOT include all information about conditions, treatments, medications, side effects, or risks that may apply to a specific patient. It is not intended to be medical advice or a substitute for the medical advice, diagnosis, or treatment of a health care provider based on the health care provider's examination and assessment of a patient’s specific and unique circumstances. Patients must speak with a health care provider for complete information about their health, medical questions, and treatment options, including any risks or benefits regarding use of medications. This information does not endorse any treatments or medications as safe, effective, or approved for treating a specific patient. [employer] Inc[employer] and its affiliates disclaim any warranty or liability relating to this information or the use thereof. The use of this information is governed by the Terms of Use, available at https://www.wolters"[employer]er.com/en/know/clinical-effectiveness-terms   Copyright   Copyright © 2024 [employer] and its affiliates and/or licensors. All rights reserved.

## 2025-02-18 ENCOUNTER — TELEPHONE (OUTPATIENT)
Age: 65
End: 2025-02-18

## 2025-04-16 ENCOUNTER — OFFICE VISIT (OUTPATIENT)
Dept: FAMILY MEDICINE CLINIC | Facility: CLINIC | Age: 65
End: 2025-04-16
Payer: MEDICARE

## 2025-04-16 VITALS
OXYGEN SATURATION: 95 % | SYSTOLIC BLOOD PRESSURE: 128 MMHG | HEIGHT: 59 IN | BODY MASS INDEX: 39.51 KG/M2 | TEMPERATURE: 98.5 F | WEIGHT: 196 LBS | DIASTOLIC BLOOD PRESSURE: 68 MMHG | HEART RATE: 98 BPM

## 2025-04-16 DIAGNOSIS — J40 BRONCHITIS: Primary | ICD-10-CM

## 2025-04-16 DIAGNOSIS — J45.20 MILD INTERMITTENT ASTHMA WITHOUT COMPLICATION: ICD-10-CM

## 2025-04-16 PROCEDURE — 94640 AIRWAY INHALATION TREATMENT: CPT | Performed by: PHYSICIAN ASSISTANT

## 2025-04-16 PROCEDURE — 99214 OFFICE O/P EST MOD 30 MIN: CPT | Performed by: PHYSICIAN ASSISTANT

## 2025-04-16 RX ORDER — PREDNISONE 10 MG/1
TABLET ORAL
Qty: 25 TABLET | Refills: 0 | Status: SHIPPED | OUTPATIENT
Start: 2025-04-16

## 2025-04-16 RX ORDER — AZITHROMYCIN 250 MG/1
TABLET, FILM COATED ORAL
Qty: 6 TABLET | Refills: 0 | Status: SHIPPED | OUTPATIENT
Start: 2025-04-16 | End: 2025-04-21

## 2025-04-16 RX ORDER — IPRATROPIUM BROMIDE AND ALBUTEROL SULFATE 2.5; .5 MG/3ML; MG/3ML
3 SOLUTION RESPIRATORY (INHALATION) ONCE
Status: COMPLETED | OUTPATIENT
Start: 2025-04-16 | End: 2025-04-16

## 2025-04-16 RX ORDER — ALBUTEROL SULFATE 90 UG/1
2 AEROSOL, METERED RESPIRATORY (INHALATION) EVERY 4 HOURS PRN
Qty: 6.7 G | Refills: 1 | Status: SHIPPED | OUTPATIENT
Start: 2025-04-16

## 2025-04-16 RX ADMIN — IPRATROPIUM BROMIDE AND ALBUTEROL SULFATE 3 ML: 2.5; .5 SOLUTION RESPIRATORY (INHALATION) at 12:45

## 2025-04-16 NOTE — PROGRESS NOTES
Mini neb    Performed by: Jia Danielson PA-C  Authorized by: Jia Danielson PA-C  Universal Protocol:  procedure performed by consultantConsent: Verbal consent obtained.  Consent given by: patient  Timeout called at: 2025 12:05 PM.    Number of treatments:  1  Treatment 1:   Pre-Procedure     Symptoms:  Wheezing and cough    Lung Sounds:  Diffuse wheezing.    Medication Administered:  Duoneb - Albuterol 2.5 mg/Atrovent 0.5 mg  Post-Procedure     Lung sounds:  Slightly improved overall.    Name: Valeria Grimm      : 1960      MRN: [id number]  Encounter Provider: Jia Danielson PA-C  Encounter Date: 2025   Encounter department: [facility]  :  Assessment & Plan  Bronchitis  Z-Malachi as directed.  Orders:  •  predniSONE 10 mg tablet; Day 1= 50 mg at once, date 2+3= 40 mg once daily, day 4+5= 30 mg once daily, day 6+7= 20 mg once daily, and day 8+9= 10 mg once daily.  •  azithromycin (ZITHROMAX) 250 mg tablet; Take two tablets on day one and then one tablet daily for the next four days.  •  Mini neb  •  ipratropium-albuterol (DUO-NEB) 0.5-2.5 mg/3 mL inhalation solution 3 mL    Mild intermittent asthma without complication  Exacerbated today will use prednisone as directed and I will have the patient use her inhaler 3-4 times a day.  Orders:  •  ProAir  (90 Base) MCG/ACT inhaler; Inhale 2 puffs every 4 (four) hours as needed for wheezing or shortness of breath  •  Mini neb  •  ipratropium-albuterol (DUO-NEB) 0.5-2.5 mg/3 mL inhalation solution 3 mL           History of Present Illness   Patient presents with:  Cough: Pt present today for a dry cough,fever, headache, chest congestion, and chills for the past 2 days  Pt did covid and flu test yesterday and it was negative           Review of Systems   Constitutional:  Positive for fever.   HENT: Negative.     Eyes: Negative.    Respiratory:  Positive for cough.    Cardiovascular: Negative.    Gastrointestinal:  "Negative.    Endocrine: Negative.    Genitourinary: Negative.    Musculoskeletal: Negative.    Skin: Negative.    Allergic/Immunologic: Negative.    Neurological:  Positive for headaches.   Hematological: Negative.    Psychiatric/Behavioral: Negative.         Objective   /68 (BP Location: Right arm, Patient Position: Sitting, Cuff Size: Large)   Pulse 98   Temp 98.5 °F (36.9 °C) (Tympanic)   Ht 4' 11\" (1.499 m)   Wt 88.9 kg (196 lb)   LMP  (LMP Unknown)   SpO2 95%   BMI 39.59 kg/m²      Physical Exam  Vitals and nursing note reviewed.   Constitutional:       Appearance: Normal appearance. She is well-developed.   HENT:      Head: Normocephalic and atraumatic.      Right Ear: Hearing, tympanic membrane, ear canal and external ear normal.      Left Ear: Hearing, tympanic membrane, ear canal and external ear normal.      Nose: Nose normal.      Mouth/Throat:      Pharynx: Oropharynx is clear.   Eyes:      General: Lids are normal.      Conjunctiva/sclera: Conjunctivae normal.      Pupils: Pupils are equal, round, and reactive to light.   Cardiovascular:      Rate and Rhythm: Normal rate and regular rhythm.      Heart sounds: No murmur heard.  Pulmonary:      Effort: Pulmonary effort is normal.      Breath sounds: Examination of the right-upper field reveals wheezing. Examination of the left-upper field reveals wheezing. Examination of the right-middle field reveals wheezing. Examination of the left-middle field reveals wheezing. Examination of the right-lower field reveals wheezing. Examination of the left-lower field reveals wheezing. Wheezing present.   Lymphadenopathy:      Cervical:      Right cervical: No superficial cervical adenopathy.     Left cervical: No superficial cervical adenopathy.   Skin:     General: Skin is warm and dry.   Neurological:      General: No focal deficit present.      Mental Status: She is alert.      Coordination: Coordination is intact.   Psychiatric:         Mood and " Affect: Mood normal.         Behavior: Behavior normal. Behavior is cooperative.         Thought Content: Thought content normal.         Judgment: Judgment normal.

## 2025-04-16 NOTE — PATIENT INSTRUCTIONS
1. Bronchitis  -     predniSONE 10 mg tablet; Day 1= 50 mg at once, date 2+3= 40 mg once daily, day 4+5= 30 mg once daily, day 6+7= 20 mg once daily, and day 8+9= 10 mg once daily.  -     azithromycin (ZITHROMAX) 250 mg tablet; Take two tablets on day one and then one tablet daily for the next four days.  -     Mini neb  -     ipratropium-albuterol (DUO-NEB) 0.5-2.5 mg/3 mL inhalation solution 3 mL  2. Mild intermittent asthma without complication  -     ProAir  (90 Base) MCG/ACT inhaler; Inhale 2 puffs every 4 (four) hours as needed for wheezing or shortness of breath  -     Mini neb  -     ipratropium-albuterol (DUO-NEB) 0.5-2.5 mg/3 mL inhalation solution 3 mL

## 2025-04-29 ENCOUNTER — TELEPHONE (OUTPATIENT)
Age: 65
End: 2025-04-29

## 2025-04-29 NOTE — TELEPHONE ENCOUNTER
Interpretor Call from Daivd/spouse asking about patients prednisone. Patient is currently in the hospital and he wants to know if he should give her the prednisone that she was taking at home. She fell and is waiting for surgery to be done. Advised that she is under the care of the hospital physicians right now. They will decide what medications she needs to take while there. David stated understanding.